# Patient Record
Sex: FEMALE | Race: BLACK OR AFRICAN AMERICAN | NOT HISPANIC OR LATINO | Employment: OTHER | URBAN - METROPOLITAN AREA
[De-identification: names, ages, dates, MRNs, and addresses within clinical notes are randomized per-mention and may not be internally consistent; named-entity substitution may affect disease eponyms.]

---

## 2023-04-18 ENCOUNTER — LAB (OUTPATIENT)
Dept: LAB | Facility: LAB | Age: 73
End: 2023-04-18
Payer: MEDICARE

## 2023-04-18 ENCOUNTER — OFFICE VISIT (OUTPATIENT)
Dept: PRIMARY CARE | Facility: CLINIC | Age: 73
End: 2023-04-18
Payer: MEDICARE

## 2023-04-18 VITALS
BODY MASS INDEX: 26.63 KG/M2 | TEMPERATURE: 97 F | HEART RATE: 96 BPM | SYSTOLIC BLOOD PRESSURE: 126 MMHG | WEIGHT: 156 LBS | HEIGHT: 64 IN | DIASTOLIC BLOOD PRESSURE: 81 MMHG | OXYGEN SATURATION: 99 %

## 2023-04-18 DIAGNOSIS — Z85.41 HISTORY OF CERVICAL CANCER: ICD-10-CM

## 2023-04-18 DIAGNOSIS — R26.81 GAIT INSTABILITY: ICD-10-CM

## 2023-04-18 DIAGNOSIS — R26.81 GAIT INSTABILITY: Primary | ICD-10-CM

## 2023-04-18 DIAGNOSIS — Z98.1 HISTORY OF LUMBAR FUSION: ICD-10-CM

## 2023-04-18 LAB
ALANINE AMINOTRANSFERASE (SGPT) (U/L) IN SER/PLAS: 18 U/L (ref 7–45)
ALBUMIN (G/DL) IN SER/PLAS: 4.1 G/DL (ref 3.4–5)
ALKALINE PHOSPHATASE (U/L) IN SER/PLAS: 109 U/L (ref 33–136)
ANION GAP IN SER/PLAS: 13 MMOL/L (ref 10–20)
ASPARTATE AMINOTRANSFERASE (SGOT) (U/L) IN SER/PLAS: 18 U/L (ref 9–39)
BASOPHILS (10*3/UL) IN BLOOD BY AUTOMATED COUNT: 0.02 X10E9/L (ref 0–0.1)
BASOPHILS/100 LEUKOCYTES IN BLOOD BY AUTOMATED COUNT: 0.3 % (ref 0–2)
BILIRUBIN TOTAL (MG/DL) IN SER/PLAS: 0.4 MG/DL (ref 0–1.2)
CALCIUM (MG/DL) IN SER/PLAS: 9.7 MG/DL (ref 8.6–10.6)
CARBON DIOXIDE, TOTAL (MMOL/L) IN SER/PLAS: 26 MMOL/L (ref 21–32)
CHLORIDE (MMOL/L) IN SER/PLAS: 103 MMOL/L (ref 98–107)
CREATININE (MG/DL) IN SER/PLAS: 0.6 MG/DL (ref 0.5–1.05)
EOSINOPHILS (10*3/UL) IN BLOOD BY AUTOMATED COUNT: 0.11 X10E9/L (ref 0–0.4)
EOSINOPHILS/100 LEUKOCYTES IN BLOOD BY AUTOMATED COUNT: 1.9 % (ref 0–6)
ERYTHROCYTE DISTRIBUTION WIDTH (RATIO) BY AUTOMATED COUNT: 13.1 % (ref 11.5–14.5)
ERYTHROCYTE MEAN CORPUSCULAR HEMOGLOBIN CONCENTRATION (G/DL) BY AUTOMATED: 31.9 G/DL (ref 32–36)
ERYTHROCYTE MEAN CORPUSCULAR VOLUME (FL) BY AUTOMATED COUNT: 94 FL (ref 80–100)
ERYTHROCYTES (10*6/UL) IN BLOOD BY AUTOMATED COUNT: 4.2 X10E12/L (ref 4–5.2)
GFR FEMALE: >90 ML/MIN/1.73M2
GLUCOSE (MG/DL) IN SER/PLAS: 82 MG/DL (ref 74–99)
HEMATOCRIT (%) IN BLOOD BY AUTOMATED COUNT: 39.5 % (ref 36–46)
HEMOGLOBIN (G/DL) IN BLOOD: 12.6 G/DL (ref 12–16)
IMMATURE GRANULOCYTES/100 LEUKOCYTES IN BLOOD BY AUTOMATED COUNT: 0.2 % (ref 0–0.9)
LEUKOCYTES (10*3/UL) IN BLOOD BY AUTOMATED COUNT: 5.8 X10E9/L (ref 4.4–11.3)
LYMPHOCYTES (10*3/UL) IN BLOOD BY AUTOMATED COUNT: 1.63 X10E9/L (ref 0.8–3)
LYMPHOCYTES/100 LEUKOCYTES IN BLOOD BY AUTOMATED COUNT: 28 % (ref 13–44)
MONOCYTES (10*3/UL) IN BLOOD BY AUTOMATED COUNT: 0.53 X10E9/L (ref 0.05–0.8)
MONOCYTES/100 LEUKOCYTES IN BLOOD BY AUTOMATED COUNT: 9.1 % (ref 2–10)
NEUTROPHILS (10*3/UL) IN BLOOD BY AUTOMATED COUNT: 3.52 X10E9/L (ref 1.6–5.5)
NEUTROPHILS/100 LEUKOCYTES IN BLOOD BY AUTOMATED COUNT: 60.5 % (ref 40–80)
NRBC (PER 100 WBCS) BY AUTOMATED COUNT: 0 /100 WBC (ref 0–0)
PLATELETS (10*3/UL) IN BLOOD AUTOMATED COUNT: 256 X10E9/L (ref 150–450)
POTASSIUM (MMOL/L) IN SER/PLAS: 4.6 MMOL/L (ref 3.5–5.3)
PROTEIN TOTAL: 7.3 G/DL (ref 6.4–8.2)
SODIUM (MMOL/L) IN SER/PLAS: 137 MMOL/L (ref 136–145)
UREA NITROGEN (MG/DL) IN SER/PLAS: 12 MG/DL (ref 6–23)

## 2023-04-18 PROCEDURE — 85025 COMPLETE CBC W/AUTO DIFF WBC: CPT

## 2023-04-18 PROCEDURE — 99204 OFFICE O/P NEW MOD 45 MIN: CPT | Performed by: STUDENT IN AN ORGANIZED HEALTH CARE EDUCATION/TRAINING PROGRAM

## 2023-04-18 PROCEDURE — 36415 COLL VENOUS BLD VENIPUNCTURE: CPT

## 2023-04-18 PROCEDURE — 80053 COMPREHEN METABOLIC PANEL: CPT

## 2023-04-18 PROCEDURE — 1036F TOBACCO NON-USER: CPT | Performed by: STUDENT IN AN ORGANIZED HEALTH CARE EDUCATION/TRAINING PROGRAM

## 2023-04-18 PROCEDURE — 1159F MED LIST DOCD IN RCRD: CPT | Performed by: STUDENT IN AN ORGANIZED HEALTH CARE EDUCATION/TRAINING PROGRAM

## 2023-04-18 ASSESSMENT — PAIN SCALES - GENERAL: PAINLEVEL: 8

## 2023-04-18 NOTE — PROGRESS NOTES
"  Medical record number: [id number]    Subjective   Patient ID: Vanesa Zelaya is a 72 y.o. female who presents for Follow-up.    1. Health maintenance  Taking turmeric, morenga, [place]'s wort,   Melatonin self DC, switched to chamomile tea      2. Hip pain  H/o lumbar spinal surgery - lumbar fusion  Hip slipping out of joint  Left side in particular  H/o cervical cancer, s/p partial hysterectomy, retained ovaries  Went skating a few weeks ago  Rearrenged furniture a few days ago  Sugar tends to make inflammation worse    Children   55  48  46             Social History:  - Lives on her own;  recently  - Feels safe at ome  - Tobacco or vaping: none  - Alcohol use: none  - Marijuana use: none  - Illicit drug use: none  - adult children live in different cities  - takes lots of supplements    Past Medical History:  BPD/psychosis/depression    Past Surgical History:  See above    Review of Systems   All other systems reviewed and are negative.      Objective   /81 (BP Location: Left arm, Patient Position: Sitting)   Pulse 96   Temp 36.1 °C (97 °F)   Ht 1.626 m (5' 4\")   Wt 70.8 kg (156 lb)   SpO2 99%   BMI 26.78 kg/m²     Physical Exam  Constitutional:       Appearance: Normal appearance.   HENT:      Head: Normocephalic and atraumatic.      Nose: Nose normal.   Eyes:      Extraocular Movements: Extraocular movements intact.      Pupils: Pupils are equal, round, and reactive to light.   Cardiovascular:      Rate and Rhythm: Normal rate and regular rhythm.      Pulses: Normal pulses.      Heart sounds: Normal heart sounds.   Pulmonary:      Effort: Pulmonary effort is normal.      Breath sounds: Normal breath sounds.   Abdominal:      General: Abdomen is flat.      Palpations: Abdomen is soft.   Musculoskeletal:      Cervical back: Normal range of motion and neck supple.      Lumbar back: No swelling, edema, deformity, tenderness or bony tenderness. Normal range of motion. Negative right straight leg " raise test and negative left straight leg raise test.      Right hip: No deformity, tenderness or bony tenderness. Decreased range of motion. Normal strength.      Left hip: No deformity, tenderness or bony tenderness. Decreased range of motion. Normal strength.      Comments: Midline scar from approx L2 to sacrum.  Get up and go test 3 seconds, requiring use of upper extremities.  Active/passive ROM of the bilat hips limited by pain in the hip joints, but the joints themselves are not TTP.   Skin:     General: Skin is warm and dry.   Neurological:      General: No focal deficit present.      Mental Status: She is alert and oriented to person, place, and time.   Psychiatric:         Mood and Affect: Mood normal.         Behavior: Behavior normal.         Thought Content: Thought content normal.         Judgment: Judgment normal.         PHYSICAL EXAM:    Assessment/Plan   Problem List Items Addressed This Visit    None  Visit Diagnoses       Gait instability    -  Primary    Relevant Orders    CBC and Auto Differential    Comprehensive metabolic panel    XR hip left 2 or 3 views    XR hip right 2 or 3 views    History of lumbar fusion        Relevant Orders    XR lumbar spine complete 4+ views    History of cervical cancer        Relevant Orders    CBC and Auto Differential    Comprehensive metabolic panel                   -------------------------------------------------------------------------------    **This note was entered into the electronic medical record using Dragon medical dictation software, and was not edited thereafter. Please excuse any errors of spelling or grammar.**    -------------------------------------------------------------------------------    OVERALL PLAN:  [ ] Cmp, CBC for screening for signs of recrurrence of CA  [ ] XR lumbar spine, bilateral hips    TOTAL time 50 minutes.

## 2023-05-19 ENCOUNTER — APPOINTMENT (OUTPATIENT)
Dept: PRIMARY CARE | Facility: CLINIC | Age: 73
End: 2023-05-19
Payer: MEDICARE

## 2023-06-20 ENCOUNTER — OFFICE VISIT (OUTPATIENT)
Dept: PRIMARY CARE | Facility: CLINIC | Age: 73
End: 2023-06-20
Payer: MEDICARE

## 2023-06-20 VITALS
OXYGEN SATURATION: 97 % | TEMPERATURE: 97.3 F | DIASTOLIC BLOOD PRESSURE: 88 MMHG | HEART RATE: 91 BPM | BODY MASS INDEX: 26.5 KG/M2 | HEIGHT: 64 IN | WEIGHT: 155.2 LBS | SYSTOLIC BLOOD PRESSURE: 137 MMHG

## 2023-06-20 DIAGNOSIS — H69.91 DYSFUNCTION OF RIGHT EUSTACHIAN TUBE: Primary | ICD-10-CM

## 2023-06-20 DIAGNOSIS — G89.29 CHRONIC PAIN OF BOTH HIPS: ICD-10-CM

## 2023-06-20 DIAGNOSIS — M25.551 CHRONIC PAIN OF BOTH HIPS: ICD-10-CM

## 2023-06-20 DIAGNOSIS — M25.552 CHRONIC PAIN OF BOTH HIPS: ICD-10-CM

## 2023-06-20 DIAGNOSIS — Z98.1 HISTORY OF LUMBAR FUSION: ICD-10-CM

## 2023-06-20 PROCEDURE — 1160F RVW MEDS BY RX/DR IN RCRD: CPT | Performed by: STUDENT IN AN ORGANIZED HEALTH CARE EDUCATION/TRAINING PROGRAM

## 2023-06-20 PROCEDURE — 1159F MED LIST DOCD IN RCRD: CPT | Performed by: STUDENT IN AN ORGANIZED HEALTH CARE EDUCATION/TRAINING PROGRAM

## 2023-06-20 PROCEDURE — 99214 OFFICE O/P EST MOD 30 MIN: CPT | Performed by: STUDENT IN AN ORGANIZED HEALTH CARE EDUCATION/TRAINING PROGRAM

## 2023-06-20 PROCEDURE — 1036F TOBACCO NON-USER: CPT | Performed by: STUDENT IN AN ORGANIZED HEALTH CARE EDUCATION/TRAINING PROGRAM

## 2023-06-20 RX ORDER — FLUTICASONE PROPIONATE 50 MCG
1 SPRAY, SUSPENSION (ML) NASAL DAILY
Qty: 16 G | Refills: 2 | Status: SHIPPED | OUTPATIENT
Start: 2023-06-20 | End: 2023-07-19

## 2023-06-20 ASSESSMENT — PAIN SCALES - GENERAL: PAINLEVEL: 9

## 2023-06-20 NOTE — PROGRESS NOTES
"06/20/23    Subjective   Patient ID: Vanesa Zelaya is a 73 y.o. female who presents for Follow-up.    HPI    Hip pain  - seen last visit  - had XR done at [facility], unable to see them  - continues to have pain, worse when she is up and moving around  - no radiation of pain  - feels like bone on bone    Ear Concerns  - hearing changes in right ear  - cleans ears regularly, using Q-tips  - have had hearing problems off and on, but worsened in the last week  - feels like water int he ear, doesn't consistently have pain      Objective   /88 (BP Location: Left arm, Patient Position: Sitting)   Pulse 91   Temp 36.3 °C (97.3 °F) (Temporal)   Ht 1.626 m (5' 4\")   Wt 70.4 kg (155 lb 3.2 oz)   SpO2 97%   BMI 26.64 kg/m²     Physical Exam    General: well-appearing, NAD  HEENT: NC/AT; ears normal appearing, TM visible bilaterally without cerumen; left TM with serous middle ear effusion present  Lungs: normal work of breathing  Neuro: grossly intact  Psych: no depression, anxiety      Assessment/Plan   Problem List Items Addressed This Visit    None  Visit Diagnoses       Dysfunction of right eustachian tube    -  Primary    Relevant Medications    fluticasone (Flonase) 50 mcg/actuation nasal spray    History of lumbar fusion        Chronic pain of both hips              Ear discomfort  - acute, uncomplicated  - likely eustachion dysfunction given middle ear effusion  - start flonase, use daily for at least 5-7 days then prn pending symptoms    Hip and low back pain  - chronic, not at goal  - unable to see prior xrays, will obtain new xrays today  - plan likely for PT (patient prefers aquatic)    RTC in about 6 weeks for follow up of hip pain    Geovany Aquino MD  "

## 2023-06-23 ENCOUNTER — TELEPHONE (OUTPATIENT)
Dept: PRIMARY CARE | Facility: CLINIC | Age: 73
End: 2023-06-23
Payer: MEDICARE

## 2023-06-23 NOTE — TELEPHONE ENCOUNTER
Called and spoke with the patient regarding imagine results.  Told her that it shows evidence of moderate arthritis in her hips, and her lower back has degenerative changes as well, but seems to be stable from prior imaging about 1.5 years ago.  Still recommend PT as the first option, and patient states she will call to get water therapy scheduled.  No further questions at this time.    Geovany Aquino MD

## 2023-08-08 ENCOUNTER — OFFICE VISIT (OUTPATIENT)
Dept: PRIMARY CARE | Facility: CLINIC | Age: 73
End: 2023-08-08
Payer: MEDICARE

## 2023-08-08 VITALS
HEART RATE: 94 BPM | OXYGEN SATURATION: 99 % | WEIGHT: 153.8 LBS | SYSTOLIC BLOOD PRESSURE: 162 MMHG | HEIGHT: 63 IN | TEMPERATURE: 97.6 F | BODY MASS INDEX: 27.25 KG/M2 | DIASTOLIC BLOOD PRESSURE: 99 MMHG

## 2023-08-08 DIAGNOSIS — R03.0 ELEVATED BLOOD PRESSURE READING: Primary | ICD-10-CM

## 2023-08-08 DIAGNOSIS — G89.29 CHRONIC LOW BACK PAIN WITHOUT SCIATICA, UNSPECIFIED BACK PAIN LATERALITY: ICD-10-CM

## 2023-08-08 DIAGNOSIS — Z71.1 CONCERN ABOUT EYE DISEASE WITHOUT DIAGNOSIS: ICD-10-CM

## 2023-08-08 DIAGNOSIS — F43.21 GRIEF REACTION: ICD-10-CM

## 2023-08-08 DIAGNOSIS — M54.50 CHRONIC LOW BACK PAIN WITHOUT SCIATICA, UNSPECIFIED BACK PAIN LATERALITY: ICD-10-CM

## 2023-08-08 PROCEDURE — 99213 OFFICE O/P EST LOW 20 MIN: CPT | Performed by: STUDENT IN AN ORGANIZED HEALTH CARE EDUCATION/TRAINING PROGRAM

## 2023-08-08 PROCEDURE — 1160F RVW MEDS BY RX/DR IN RCRD: CPT | Performed by: STUDENT IN AN ORGANIZED HEALTH CARE EDUCATION/TRAINING PROGRAM

## 2023-08-08 PROCEDURE — 1036F TOBACCO NON-USER: CPT | Performed by: STUDENT IN AN ORGANIZED HEALTH CARE EDUCATION/TRAINING PROGRAM

## 2023-08-08 PROCEDURE — 1126F AMNT PAIN NOTED NONE PRSNT: CPT | Performed by: STUDENT IN AN ORGANIZED HEALTH CARE EDUCATION/TRAINING PROGRAM

## 2023-08-08 PROCEDURE — 1159F MED LIST DOCD IN RCRD: CPT | Performed by: STUDENT IN AN ORGANIZED HEALTH CARE EDUCATION/TRAINING PROGRAM

## 2023-08-08 ASSESSMENT — PAIN SCALES - GENERAL: PAINLEVEL: 0-NO PAIN

## 2023-08-08 NOTE — PROGRESS NOTES
"08/08/23    Subjective   Patient ID: Vanesa Zelaya is a 73 y.o. female who presents for Follow-up.    HPI    Back pain  - hasn't started PT yet  - has been doing PT on youtube, helping significantly  - interested in water therapy    HTN  - history of HTN  - not on medications  - checks at home, never above 130 systolic    Eye concerns  - told she had cataracts and/or glaucoma  - wants second opinion    Grief  - surrounding death of her   - doesn't want any meds  - interested in a counselor      Objective   BP (!) 162/99 (BP Location: Left arm, Patient Position: Sitting, BP Cuff Size: Adult)   Pulse 94   Temp 36.4 °C (97.6 °F) (Temporal)   Ht 1.6 m (5' 3\")   Wt 69.8 kg (153 lb 12.8 oz)   SpO2 99%   BMI 27.24 kg/m²     Physical Exam    General: well-appearing, NAD  HEENT: NC/AT  Lungs: normal work of breathing  Neuro: grossly intact  Psych: no depression, anxiety      Assessment/Plan   Problem List Items Addressed This Visit    None  Visit Diagnoses       Elevated blood pressure reading    -  Primary    Chronic low back pain without sciatica, unspecified back pain laterality        Relevant Orders    Referral to Physical Therapy    Concern about eye disease without diagnosis        Relevant Orders    Referral to Ophthalmology    Grief reaction              Elevated Bp  - normal at home  - nothing to do at this time, patient will continue to monitor    LBP  - chronic, stable  - continue current exercises  - patient intersted in aquatic therapy, referral placed    Eye Concerns  - patient told she had glaucoma and cataracts, would like to see  eye specialists  - ophtho referral placed    Grief  - chronic, stable  - given community resources to call to scheduled with therapist    RTC as needed for follow up of LBP    Geovany Aquino MD  "

## 2023-09-27 PROBLEM — H90.3 SENSORINEURAL HEARING LOSS (SNHL) OF BOTH EARS: Status: ACTIVE | Noted: 2023-09-27

## 2023-09-27 PROBLEM — H93.19 TINNITUS: Status: ACTIVE | Noted: 2023-09-27

## 2023-09-27 PROBLEM — M99.09 SEGMENTAL AND SOMATIC DYSFUNCTION: Status: ACTIVE | Noted: 2023-09-27

## 2023-09-27 PROBLEM — M54.40 CHRONIC RIGHT-SIDED LOW BACK PAIN WITH SCIATICA: Status: ACTIVE | Noted: 2021-12-20

## 2023-09-27 PROBLEM — M54.12 RADICULITIS, CERVICAL: Status: ACTIVE | Noted: 2023-09-27

## 2023-09-27 PROBLEM — K45.8 HERNIA, INTERNAL: Status: ACTIVE | Noted: 2023-09-27

## 2023-09-27 PROBLEM — R03.0 ELEVATED BLOOD PRESSURE READING: Status: ACTIVE | Noted: 2023-09-27

## 2023-09-27 PROBLEM — H93.13 SUBJECTIVE TINNITUS OF BOTH EARS: Status: ACTIVE | Noted: 2023-09-27

## 2023-09-27 PROBLEM — G89.29 CHRONIC RIGHT-SIDED LOW BACK PAIN WITH SCIATICA: Status: ACTIVE | Noted: 2021-12-20

## 2023-09-27 PROBLEM — M47.812 CERVICAL SPONDYLOSIS WITHOUT MYELOPATHY: Status: ACTIVE | Noted: 2023-09-27

## 2023-09-27 PROBLEM — M43.10 SPONDYLOLISTHESIS, GRADE 1: Status: ACTIVE | Noted: 2023-09-27

## 2023-09-27 PROBLEM — F43.10 POST TRAUMATIC STRESS DISORDER (PTSD): Status: ACTIVE | Noted: 2023-09-27

## 2023-09-27 PROBLEM — R31.9 HEMATURIA: Status: ACTIVE | Noted: 2023-09-27

## 2023-09-27 PROBLEM — G51.0 BELL'S PALSY: Status: ACTIVE | Noted: 2023-09-27

## 2023-09-27 PROBLEM — I10 HYPERTENSION: Status: ACTIVE | Noted: 2023-09-27

## 2023-09-27 PROBLEM — G47.00 INSOMNIA: Status: ACTIVE | Noted: 2023-09-27

## 2023-09-27 RX ORDER — CHOLECALCIFEROL (VITAMIN D3) 125 MCG
CAPSULE ORAL DAILY
COMMUNITY
End: 2024-03-19 | Stop reason: WASHOUT

## 2023-10-06 ENCOUNTER — EVALUATION (OUTPATIENT)
Dept: PHYSICAL THERAPY | Facility: CLINIC | Age: 73
End: 2023-10-06
Payer: MEDICARE

## 2023-10-06 DIAGNOSIS — G89.29 CHRONIC RIGHT-SIDED LOW BACK PAIN WITH SCIATICA, SCIATICA LATERALITY UNSPECIFIED: Primary | ICD-10-CM

## 2023-10-06 DIAGNOSIS — G89.29 CHRONIC LOW BACK PAIN WITHOUT SCIATICA, UNSPECIFIED BACK PAIN LATERALITY: ICD-10-CM

## 2023-10-06 DIAGNOSIS — R29.3 ABNORMAL POSTURE: ICD-10-CM

## 2023-10-06 DIAGNOSIS — M54.50 CHRONIC LOW BACK PAIN WITHOUT SCIATICA, UNSPECIFIED BACK PAIN LATERALITY: ICD-10-CM

## 2023-10-06 DIAGNOSIS — M54.40 CHRONIC RIGHT-SIDED LOW BACK PAIN WITH SCIATICA, SCIATICA LATERALITY UNSPECIFIED: Primary | ICD-10-CM

## 2023-10-06 PROBLEM — M62.81 MUSCLE WEAKNESS: Status: ACTIVE | Noted: 2023-10-06

## 2023-10-06 PROCEDURE — 87086 URINE CULTURE/COLONY COUNT: CPT

## 2023-10-06 PROCEDURE — 97535 SELF CARE MNGMENT TRAINING: CPT | Mod: GP | Performed by: PHYSICAL THERAPIST

## 2023-10-06 PROCEDURE — 87186 SC STD MICRODIL/AGAR DIL: CPT

## 2023-10-06 PROCEDURE — 97163 PT EVAL HIGH COMPLEX 45 MIN: CPT | Mod: GP | Performed by: PHYSICAL THERAPIST

## 2023-10-06 ASSESSMENT — ENCOUNTER SYMPTOMS
OCCASIONAL FEELINGS OF UNSTEADINESS: 1
DEPRESSION: 0
LOSS OF SENSATION IN FEET: 0

## 2023-10-06 NOTE — LETTER
October 6, 2023     Patient: Vanesa Zelaya   YOB: 1950   Date of Visit: 10/6/2023       To Whom it May Concern:    Vanesa Zelaya was seen in my clinic on 10/6/2023. She {Return to school/sport:61745}.    If you have any questions or concerns, please don't hesitate to call.         Sincerely,          Daysi Patel, PT        CC: No Recipients
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
## 2023-10-06 NOTE — LETTER
October 6, 2023     Patient: Vanesa Zelaya   YOB: 1950   Date of Visit: 10/6/2023       To Whom It May Concern:    It is my medical opinion that Vanesa Zelaya {Work release (duty restriction):50880}.    If you have any questions or concerns, please don't hesitate to call.         Sincerely,        Daysi Patel, PT    CC: No Recipients

## 2023-10-07 ENCOUNTER — LAB REQUISITION (OUTPATIENT)
Dept: LAB | Facility: HOSPITAL | Age: 73
End: 2023-10-07
Payer: MEDICARE

## 2023-10-07 DIAGNOSIS — N39.0 URINARY TRACT INFECTION, SITE NOT SPECIFIED: ICD-10-CM

## 2023-10-09 ENCOUNTER — TREATMENT (OUTPATIENT)
Dept: PHYSICAL THERAPY | Facility: CLINIC | Age: 73
End: 2023-10-09
Payer: MEDICARE

## 2023-10-09 DIAGNOSIS — G89.29 CHRONIC RIGHT-SIDED LOW BACK PAIN WITH SCIATICA, SCIATICA LATERALITY UNSPECIFIED: ICD-10-CM

## 2023-10-09 DIAGNOSIS — R29.3 ABNORMAL POSTURE: ICD-10-CM

## 2023-10-09 DIAGNOSIS — G89.29 CHRONIC LOW BACK PAIN WITHOUT SCIATICA, UNSPECIFIED BACK PAIN LATERALITY: Primary | ICD-10-CM

## 2023-10-09 DIAGNOSIS — M54.40 CHRONIC RIGHT-SIDED LOW BACK PAIN WITH SCIATICA, SCIATICA LATERALITY UNSPECIFIED: ICD-10-CM

## 2023-10-09 DIAGNOSIS — M54.50 CHRONIC LOW BACK PAIN WITHOUT SCIATICA, UNSPECIFIED BACK PAIN LATERALITY: Primary | ICD-10-CM

## 2023-10-09 PROCEDURE — 97110 THERAPEUTIC EXERCISES: CPT | Mod: GP | Performed by: PHYSICAL THERAPIST

## 2023-10-09 PROCEDURE — 97535 SELF CARE MNGMENT TRAINING: CPT | Mod: GP | Performed by: PHYSICAL THERAPIST

## 2023-10-09 NOTE — PROGRESS NOTES
Physical Therapy  Physical Therapy Treatment    Patient Name: Vanesa Zelaya  MRN: [id number]  Today's Date: 10/9/2023       Current Problem  1. Chronic low back pain without sciatica, unspecified back pain laterality        2. Chronic right-sided low back pain with sciatica, sciatica laterality unspecified        3. Abnormal posture              Insurance:   Auth for 14 visits through 1/7/24, not including evaluation    General     Precautions  Precautions  STEADI Fall Risk Score (The score of 4 or more indicates an increased risk of falling): 8  Vital Signs     Pain       Subjective:   Patient reports stiffness and aching in the hips and back okay this morning. L hip is worse.   - went to urgent care and was put on antibiotics for UTI  - no hip surgeries, only back surgeries    Objective:   Posture: forward head, rounded shoulders, increased thoracic kyphosis, decreased lumbar lordosis  SL balance: 10 sec on each, increased hip/torso sway  DL Squat: depth wnl  Hypermobility of hip joints  Hip ROM: mild end range joint limitations  Hamstrings: able to get foot to head or hands to floor bilaterally  Lumbar rotation: mid-TL junction mobility and hip compensation  Hip MMT: 4-/5 bilaterally  Core: moderate    Treatments:   Exercise Sets/Reps Comments   Education: emphasis on strength, stability and joint blood flow/circulation, discussed slow progression of exercises, stretching and weights/resistance, discussed pool/aquatic therapy and land based expectations   DL squat     Tandem balance     Hip stretching: piriformis, hamstring     Lumbar rotation stretching                   Assessment: Patient presents with signs and symptoms consistent with OA of lumbar spine and hips, muscle weakness, abnormal posture, resulting in limited participation in pain-free ADLs and inability to perform at their prior level of function. Pt would benefit from physical therapy to address the impairments found & listed previously in the  objective section in order to return to safe and pain-free ADLs and prior level of function.       Plan: aquatic therapy for 4 visits, then reassess for land or DC       Daysi Patel, PT

## 2023-10-10 LAB — BACTERIA UR CULT: ABNORMAL

## 2023-10-13 ENCOUNTER — APPOINTMENT (OUTPATIENT)
Dept: PHYSICAL THERAPY | Facility: CLINIC | Age: 73
End: 2023-10-13
Payer: MEDICARE

## 2023-10-20 ENCOUNTER — TREATMENT (OUTPATIENT)
Dept: PHYSICAL THERAPY | Facility: CLINIC | Age: 73
End: 2023-10-20
Payer: MEDICARE

## 2023-10-20 ENCOUNTER — APPOINTMENT (OUTPATIENT)
Dept: PHYSICAL THERAPY | Facility: CLINIC | Age: 73
End: 2023-10-20
Payer: MEDICARE

## 2023-10-20 DIAGNOSIS — G89.29 CHRONIC RIGHT-SIDED LOW BACK PAIN WITH SCIATICA, SCIATICA LATERALITY UNSPECIFIED: ICD-10-CM

## 2023-10-20 DIAGNOSIS — R29.3 ABNORMAL POSTURE: ICD-10-CM

## 2023-10-20 DIAGNOSIS — M54.40 CHRONIC RIGHT-SIDED LOW BACK PAIN WITH SCIATICA, SCIATICA LATERALITY UNSPECIFIED: ICD-10-CM

## 2023-10-20 DIAGNOSIS — M54.50 CHRONIC LOW BACK PAIN WITHOUT SCIATICA, UNSPECIFIED BACK PAIN LATERALITY: ICD-10-CM

## 2023-10-20 DIAGNOSIS — G89.29 CHRONIC LOW BACK PAIN WITHOUT SCIATICA, UNSPECIFIED BACK PAIN LATERALITY: ICD-10-CM

## 2023-10-20 PROCEDURE — 97113 AQUATIC THERAPY/EXERCISES: CPT | Mod: GP,CQ

## 2023-10-20 ASSESSMENT — PAIN SCALES - GENERAL: PAINLEVEL_OUTOF10: 6

## 2023-10-20 ASSESSMENT — PAIN - FUNCTIONAL ASSESSMENT: PAIN_FUNCTIONAL_ASSESSMENT: 0-10

## 2023-10-20 NOTE — PROGRESS NOTES
Physical Therapy  Physical Therapy Treatment    Patient Name: Vanesa Zelaya  MRN: [id number]  Today's Date: 10/20/2023  Time Calculation  Start Time: 1345  Stop Time: 1430  Time Calculation (min): 45 min    Current Problem  1. Chronic low back pain without sciatica, unspecified back pain laterality  Follow Up In Physical Therapy      2. Chronic right-sided low back pain with sciatica, sciatica laterality unspecified  Follow Up In Physical Therapy      3. Abnormal posture  Follow Up In Physical Therapy              Insurance:  Visit 3 of 14  Auth for 14 visits through 1/7/24, not including evaluation  [employer] medicare        Precautions  Precautions Comment: None       Pain  Pain Assessment: 0-10  Pain Score: 6    Subjective:   Patient reports stiffness and aching in the hips and back with LBP 6/10 and left hip 8/10.  15 min late today.        Objective:   Posture: forward head, rounded shoulders, increased thoracic kyphosis, decreased lumbar lordosis    TREATMENT:  Water level 4 FT   Water temp 92   MIP 5 '  Alt Abd/add 5 '  A/P LE swings 3 ' each  Back at wall:  KB pushes and rotation 2 min each   H/T raises 3 min  Mini squats 3'  LE circles  1 ' each  HS st. Step 1' each                   Assessment:   Aquatic environment allowed for greater ease of motion and better tolerance of prolonged exercising without increased pain or stiffness.  Cues to stay in comfortable range.  Discussed using her Silver Sneakers benefit at [facility]           Plan: aquatic therapy for 3 more visits then reassess for land or DC per plan  Last 2 visits are with therapist.  Issue aquatic ex next visit.         Yesy Nicole, PTA

## 2023-10-26 NOTE — PROGRESS NOTES
Physical Therapy  Physical Therapy Treatment    Patient Name: Vanesa Zelaya  MRN: [id number]  Today's Date: 10/27/2023  Time Calculation  Start Time: 1150  Stop Time: 1230  Time Calculation (min): 40 min    Current Problem  1. Chronic low back pain without sciatica, unspecified back pain laterality  Follow Up In Physical Therapy      2. Chronic right-sided low back pain with sciatica, sciatica laterality unspecified  Follow Up In Physical Therapy      3. Abnormal posture  Follow Up In Physical Therapy                Insurance:  Visit 4of 14  Auth for 14 visits through 1/7/24, not including evaluation  [employer] medicare        Precautions  Precautions Comment: None       Pain  Pain Assessment: 0-10  Pain Score: 8    Subjective:   Patient reports stiffness and aching in the hips and back with B hip pain 8/10 today.  Felt great after her last pool visit and then did a lot of yard work because she was feeling good but suffered with increased pain for a few days after yard work.       Objective:   Posture: forward head, rounded shoulders, increased thoracic kyphosis, decreased lumbar lordosis    TREATMENT:  Water level 4 FT   Water temp 92   Walk F/B 5 min.  Lateral 5 min   MIP 5 '  Alt Abd/add 5 '  TT and small lifts   A/P LE swings 3 ' each  Back at wall:  KB pushes and rotation 2 min each   H/T raises 3 min  Mini squats 3'  LE circles  2 ' each  HS st. Ledge  1' each       Assessment:   Aquatic environment allowed for greater ease of motion and better tolerance of prolonged exercising without increased pain or stiffness.  Cues to stay in comfortable range.           Plan: aquatic therapy for 2 more visits  with therapist then reassess for land or DC per plan         Yesy Nicole, PTA

## 2023-10-27 ENCOUNTER — TREATMENT (OUTPATIENT)
Dept: PHYSICAL THERAPY | Facility: CLINIC | Age: 73
End: 2023-10-27
Payer: MEDICARE

## 2023-10-27 DIAGNOSIS — G89.29 CHRONIC LOW BACK PAIN WITHOUT SCIATICA, UNSPECIFIED BACK PAIN LATERALITY: ICD-10-CM

## 2023-10-27 DIAGNOSIS — R29.3 ABNORMAL POSTURE: ICD-10-CM

## 2023-10-27 DIAGNOSIS — G89.29 CHRONIC RIGHT-SIDED LOW BACK PAIN WITH SCIATICA, SCIATICA LATERALITY UNSPECIFIED: ICD-10-CM

## 2023-10-27 DIAGNOSIS — M54.40 CHRONIC RIGHT-SIDED LOW BACK PAIN WITH SCIATICA, SCIATICA LATERALITY UNSPECIFIED: ICD-10-CM

## 2023-10-27 DIAGNOSIS — M54.50 CHRONIC LOW BACK PAIN WITHOUT SCIATICA, UNSPECIFIED BACK PAIN LATERALITY: ICD-10-CM

## 2023-10-27 PROCEDURE — 97113 AQUATIC THERAPY/EXERCISES: CPT | Mod: GP,CQ

## 2023-10-27 ASSESSMENT — PAIN SCALES - GENERAL: PAINLEVEL_OUTOF10: 8

## 2023-10-27 ASSESSMENT — PAIN - FUNCTIONAL ASSESSMENT: PAIN_FUNCTIONAL_ASSESSMENT: 0-10

## 2023-11-03 ENCOUNTER — TREATMENT (OUTPATIENT)
Dept: PHYSICAL THERAPY | Facility: CLINIC | Age: 73
End: 2023-11-03
Payer: MEDICARE

## 2023-11-03 DIAGNOSIS — M54.40 CHRONIC RIGHT-SIDED LOW BACK PAIN WITH SCIATICA, SCIATICA LATERALITY UNSPECIFIED: ICD-10-CM

## 2023-11-03 DIAGNOSIS — M54.50 CHRONIC LOW BACK PAIN WITHOUT SCIATICA, UNSPECIFIED BACK PAIN LATERALITY: ICD-10-CM

## 2023-11-03 DIAGNOSIS — R29.3 ABNORMAL POSTURE: ICD-10-CM

## 2023-11-03 DIAGNOSIS — G89.29 CHRONIC LOW BACK PAIN WITHOUT SCIATICA, UNSPECIFIED BACK PAIN LATERALITY: ICD-10-CM

## 2023-11-03 DIAGNOSIS — G89.29 CHRONIC RIGHT-SIDED LOW BACK PAIN WITH SCIATICA, SCIATICA LATERALITY UNSPECIFIED: ICD-10-CM

## 2023-11-03 PROCEDURE — 97113 AQUATIC THERAPY/EXERCISES: CPT | Mod: GP | Performed by: PHYSICAL THERAPIST

## 2023-11-03 ASSESSMENT — PAIN SCALES - GENERAL: PAINLEVEL_OUTOF10: 8

## 2023-11-03 NOTE — PROGRESS NOTES
Physical Therapy  Physical Therapy Treatment    Patient Name: Vanesa Zelaya  MRN: [id number]  Today's Date: 11/3/2023       Current Problem  1. Chronic low back pain without sciatica, unspecified back pain laterality  Follow Up In Physical Therapy      2. Chronic right-sided low back pain with sciatica, sciatica laterality unspecified  Follow Up In Physical Therapy      3. Abnormal posture  Follow Up In Physical Therapy          Insurance:  Visit 5 of 14  Auth for 14 visits through 1/7/24, not including evaluation  [employer] medicare            Pain  Pain Score: 8    Subjective:   Patient reports stiffness and aching in the hips and back with B hip pain 8/10 today.  Felt great after her last pool visit  - discussed with Yesy wilson [facility] location and location for senior center fitness     LBP: 8/10 - back and hips    Objective:   Posture: forward head, rounded shoulders, increased thoracic kyphosis, decreased lumbar lordosis  - hip flexion in sitting and standing    TREATMENT:  Water level 4 FT   Water temp 92   Walk F/B 5 min.  Lateral 5 min   MIP 5 '  Alt Abd/add 5 '  TT and small lifts   A/P LE swings 3 ' each  Back at wall:  KB pushes and rotation 2 min each   H/T raises 3 min  Mini squats 3'  LE circles  2 ' each  HS st. Ledge  1' each       Assessment:   Aquatic environment allowed for greater ease of motion and better tolerance of prolonged exercising without increased pain or stiffness.  Cues to stay in comfortable range.         Plan: aquatic therapy for 1 more visit with therapist then reassess for land or DC per plan         Daysi Patel, PT

## 2023-11-10 ENCOUNTER — TREATMENT (OUTPATIENT)
Dept: PHYSICAL THERAPY | Facility: CLINIC | Age: 73
End: 2023-11-10
Payer: MEDICARE

## 2023-11-10 DIAGNOSIS — G89.29 CHRONIC RIGHT-SIDED LOW BACK PAIN WITH SCIATICA, SCIATICA LATERALITY UNSPECIFIED: ICD-10-CM

## 2023-11-10 DIAGNOSIS — M54.50 CHRONIC LOW BACK PAIN WITHOUT SCIATICA, UNSPECIFIED BACK PAIN LATERALITY: ICD-10-CM

## 2023-11-10 DIAGNOSIS — G89.29 CHRONIC LOW BACK PAIN WITHOUT SCIATICA, UNSPECIFIED BACK PAIN LATERALITY: ICD-10-CM

## 2023-11-10 DIAGNOSIS — M54.40 CHRONIC RIGHT-SIDED LOW BACK PAIN WITH SCIATICA, SCIATICA LATERALITY UNSPECIFIED: ICD-10-CM

## 2023-11-10 DIAGNOSIS — R29.3 ABNORMAL POSTURE: ICD-10-CM

## 2023-11-10 PROCEDURE — 97113 AQUATIC THERAPY/EXERCISES: CPT | Mod: GP | Performed by: PHYSICAL THERAPIST

## 2023-11-10 NOTE — PROGRESS NOTES
Physical Therapy  Physical Therapy Treatment    Patient Name: Vanesa Zelaya  MRN: [id number]  Today's Date: 11/10/2023  Time Calculation  Start Time: 1025  Stop Time: 1105  Time Calculation (min): 40 min    Current Problem  1. Chronic low back pain without sciatica, unspecified back pain laterality  Follow Up In Physical Therapy      2. Chronic right-sided low back pain with sciatica, sciatica laterality unspecified  Follow Up In Physical Therapy      3. Abnormal posture  Follow Up In Physical Therapy          Insurance:  Visit 6 of 14  Auth for 14 visits through 1/7/24, not including evaluation  [employer] medicare            Pain       Subjective:   Patient reports stiffness and aching in the back due to moving mulch and cinderbocks in preparation of outdoor area for winter, was able to contact insurance for silver sneakers and go to [employer] for setting up classes. Plans to go to [facility] [employer] for tour and set up of aquatic classes following today's visit.   LBP: 8/10 - back and hips    Objective:   Posture: forward head, rounded shoulders, increased thoracic kyphosis, decreased lumbar lordosis  - hip flexion in sitting and standing    TREATMENT:  Water level 4 FT   Water temp 92   Walk F/B 5 min.  Lateral 5 min  MIP 5 '  Alt Abd/add 5 '  TT and small lifts   A/P LE swings 3 ' each  Back at wall:  KB pushes and rotation 2 min each   H/T raises 3 min  Mini squats 3'  LE circles  2 ' each  HS st. Ledge  1' each     Access Code: [email or web address]  URL: [email or web address]  Date: 11/10/2023  Prepared by: Daysi Patel    Exercises  - Forward and Backward Stepping at Pool Wall  - 1 x daily - 7 x weekly - 3 sets - 10 reps  - Lateral Stepping at Pool Wall  - 1 x daily - 7 x weekly - 3 sets - 10 reps  - Heel Toe Raises at Pool Wall  - 1 x daily - 7 x weekly - 3 sets - 10 reps  - Standing March at Pool Wall  - 1 x daily - 7 x weekly - 3 sets - 10 reps  - Standing Hip Circles at Pool Wall  - 1  x daily - 7 x weekly - 3 sets - 10 reps  - Standing Hip Abduction Adduction at Pool Wall  - 1 x daily - 7 x weekly - 3 sets - 10 reps  - Standing Hip Flexion Extension at Pool Wall  - 1 x daily - 7 x weekly - 3 sets - 10 reps  - Hamstring Stretch at Pool Wall  - 1 x daily - 7 x weekly - 3 sets - 10 reps    Assessment:   Patient will continue aquatic exercises independently and within a group setting at a local [facility] and chair yoga classes. Will return to therapy on an as needed basis.       Plan: return as needed, will attempt hep independently       Daysi Patel, PT

## 2024-02-05 ENCOUNTER — DOCUMENTATION (OUTPATIENT)
Dept: PHYSICAL THERAPY | Facility: CLINIC | Age: 74
End: 2024-02-05
Payer: MEDICARE

## 2024-02-05 NOTE — PROGRESS NOTES
Physical Therapy    Discharge Summary    Name: Vansea Zelaya  MRN: [id number]  : 1950  Date: 24    Discharge Summary: PT    Discharge Information: Date of discharge 24    Rehab Discharge Reason: Achieved all and/or the most significant goals(s)    Daysi Patel, PT

## 2024-03-18 ENCOUNTER — APPOINTMENT (OUTPATIENT)
Dept: PRIMARY CARE | Facility: CLINIC | Age: 74
End: 2024-03-18
Payer: MEDICARE

## 2024-03-19 ENCOUNTER — OFFICE VISIT (OUTPATIENT)
Dept: PRIMARY CARE | Facility: CLINIC | Age: 74
End: 2024-03-19
Payer: MEDICARE

## 2024-03-19 VITALS
HEART RATE: 82 BPM | WEIGHT: 158 LBS | OXYGEN SATURATION: 98 % | RESPIRATION RATE: 16 BRPM | SYSTOLIC BLOOD PRESSURE: 190 MMHG | TEMPERATURE: 97.9 F | DIASTOLIC BLOOD PRESSURE: 103 MMHG | HEIGHT: 63 IN | BODY MASS INDEX: 28 KG/M2

## 2024-03-19 DIAGNOSIS — F33.3 SEVERE EPISODE OF RECURRENT MAJOR DEPRESSIVE DISORDER, WITH PSYCHOTIC FEATURES (MULTI): ICD-10-CM

## 2024-03-19 DIAGNOSIS — K40.91 UNILATERAL RECURRENT INGUINAL HERNIA WITHOUT OBSTRUCTION OR GANGRENE: ICD-10-CM

## 2024-03-19 DIAGNOSIS — R10.30 INGUINAL PAIN, UNSPECIFIED LATERALITY: ICD-10-CM

## 2024-03-19 DIAGNOSIS — H61.21 IMPACTED CERUMEN OF RIGHT EAR: ICD-10-CM

## 2024-03-19 DIAGNOSIS — F31.9 BIPOLAR AFFECTIVE DISORDER, REMISSION STATUS UNSPECIFIED (MULTI): Primary | ICD-10-CM

## 2024-03-19 DIAGNOSIS — M47.816 LUMBAR SPONDYLOSIS: ICD-10-CM

## 2024-03-19 DIAGNOSIS — M16.0 PRIMARY OSTEOARTHRITIS OF BOTH HIPS: ICD-10-CM

## 2024-03-19 LAB
ALBUMIN SERPL BCP-MCNC: 4.2 G/DL (ref 3.4–5)
ALP SERPL-CCNC: 113 U/L (ref 33–136)
ALT SERPL W P-5'-P-CCNC: 18 U/L (ref 7–45)
ANION GAP SERPL CALC-SCNC: 13 MMOL/L (ref 10–20)
AST SERPL W P-5'-P-CCNC: 16 U/L (ref 9–39)
BASOPHILS # BLD AUTO: 0.03 X10*3/UL (ref 0–0.1)
BASOPHILS NFR BLD AUTO: 0.5 %
BILIRUB SERPL-MCNC: 0.3 MG/DL (ref 0–1.2)
BUN SERPL-MCNC: 10 MG/DL (ref 6–23)
CALCIUM SERPL-MCNC: 10.2 MG/DL (ref 8.6–10.6)
CHLORIDE SERPL-SCNC: 102 MMOL/L (ref 98–107)
CO2 SERPL-SCNC: 29 MMOL/L (ref 21–32)
CREAT SERPL-MCNC: 0.57 MG/DL (ref 0.5–1.05)
EGFRCR SERPLBLD CKD-EPI 2021: >90 ML/MIN/1.73M*2
EOSINOPHIL # BLD AUTO: 0.13 X10*3/UL (ref 0–0.4)
EOSINOPHIL NFR BLD AUTO: 2.3 %
ERYTHROCYTE [DISTWIDTH] IN BLOOD BY AUTOMATED COUNT: 13.3 % (ref 11.5–14.5)
GLUCOSE SERPL-MCNC: 88 MG/DL (ref 74–99)
HCT VFR BLD AUTO: 41 % (ref 36–46)
HGB BLD-MCNC: 13.1 G/DL (ref 12–16)
IMM GRANULOCYTES # BLD AUTO: 0.01 X10*3/UL (ref 0–0.5)
IMM GRANULOCYTES NFR BLD AUTO: 0.2 % (ref 0–0.9)
LIPASE SERPL-CCNC: 98 U/L (ref 9–82)
LYMPHOCYTES # BLD AUTO: 1.88 X10*3/UL (ref 0.8–3)
LYMPHOCYTES NFR BLD AUTO: 32.9 %
MCH RBC QN AUTO: 29.3 PG (ref 26–34)
MCHC RBC AUTO-ENTMCNC: 32 G/DL (ref 32–36)
MCV RBC AUTO: 92 FL (ref 80–100)
MONOCYTES # BLD AUTO: 0.58 X10*3/UL (ref 0.05–0.8)
MONOCYTES NFR BLD AUTO: 10.2 %
NEUTROPHILS # BLD AUTO: 3.08 X10*3/UL (ref 1.6–5.5)
NEUTROPHILS NFR BLD AUTO: 53.9 %
NRBC BLD-RTO: 0 /100 WBCS (ref 0–0)
PLATELET # BLD AUTO: 238 X10*3/UL (ref 150–450)
POC APPEARANCE, URINE: CLEAR
POC BILIRUBIN, URINE: NEGATIVE
POC BLOOD, URINE: NEGATIVE
POC COLOR, URINE: YELLOW
POC GLUCOSE, URINE: NEGATIVE MG/DL
POC KETONES, URINE: NEGATIVE MG/DL
POC LEUKOCYTES, URINE: NEGATIVE
POC NITRITE,URINE: NEGATIVE
POC PH, URINE: 7 PH
POC PROTEIN, URINE: NEGATIVE MG/DL
POC SPECIFIC GRAVITY, URINE: >=1.03
POC UROBILINOGEN, URINE: 0.2 EU/DL
POTASSIUM SERPL-SCNC: 4.7 MMOL/L (ref 3.5–5.3)
PROT SERPL-MCNC: 7.7 G/DL (ref 6.4–8.2)
RBC # BLD AUTO: 4.47 X10*6/UL (ref 4–5.2)
SODIUM SERPL-SCNC: 139 MMOL/L (ref 136–145)
WBC # BLD AUTO: 5.7 X10*3/UL (ref 4.4–11.3)

## 2024-03-19 PROCEDURE — 3080F DIAST BP >= 90 MM HG: CPT | Performed by: NURSE PRACTITIONER

## 2024-03-19 PROCEDURE — 36415 COLL VENOUS BLD VENIPUNCTURE: CPT | Performed by: NURSE PRACTITIONER

## 2024-03-19 PROCEDURE — 1125F AMNT PAIN NOTED PAIN PRSNT: CPT | Performed by: NURSE PRACTITIONER

## 2024-03-19 PROCEDURE — 3077F SYST BP >= 140 MM HG: CPT | Performed by: NURSE PRACTITIONER

## 2024-03-19 PROCEDURE — 83690 ASSAY OF LIPASE: CPT | Performed by: NURSE PRACTITIONER

## 2024-03-19 PROCEDURE — 81002 URINALYSIS NONAUTO W/O SCOPE: CPT | Performed by: NURSE PRACTITIONER

## 2024-03-19 PROCEDURE — 99215 OFFICE O/P EST HI 40 MIN: CPT | Performed by: NURSE PRACTITIONER

## 2024-03-19 PROCEDURE — 85025 COMPLETE CBC W/AUTO DIFF WBC: CPT | Performed by: NURSE PRACTITIONER

## 2024-03-19 PROCEDURE — 1036F TOBACCO NON-USER: CPT | Performed by: NURSE PRACTITIONER

## 2024-03-19 PROCEDURE — 87086 URINE CULTURE/COLONY COUNT: CPT | Performed by: NURSE PRACTITIONER

## 2024-03-19 PROCEDURE — 80053 COMPREHEN METABOLIC PANEL: CPT | Performed by: NURSE PRACTITIONER

## 2024-03-19 ASSESSMENT — PAIN SCALES - GENERAL: PAINLEVEL: 8
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
## 2024-03-19 NOTE — PATIENT INSTRUCTIONS
Thank you for coming in for your visit today!    Please follow up in 4 months or sooner if needed.    If MD care preferred, you may schedule with Dr. Devlin.     Today we completed blood work. We will contact you with any abnormalities from this testing.    Call to schedule with physical therapy, general surgery (hernia follow up), and psychiatry.   A community health worker should reach out to you for additional supports.     One thing at a time!     Increase oral hydration!       Call 911 or go to the emergency room if you have pain in your chest, difficulty breathing, or other life threatening symptoms.

## 2024-03-20 LAB — BACTERIA UR CULT: NORMAL

## 2024-03-25 ENCOUNTER — TELEPHONE (OUTPATIENT)
Dept: PRIMARY CARE | Facility: CLINIC | Age: 74
End: 2024-03-25
Payer: MEDICARE

## 2024-03-25 DIAGNOSIS — M54.50 CHRONIC LOW BACK PAIN WITHOUT SCIATICA, UNSPECIFIED BACK PAIN LATERALITY: ICD-10-CM

## 2024-03-25 DIAGNOSIS — G89.29 CHRONIC LOW BACK PAIN WITHOUT SCIATICA, UNSPECIFIED BACK PAIN LATERALITY: ICD-10-CM

## 2024-03-25 DIAGNOSIS — M16.0 PRIMARY OSTEOARTHRITIS OF BOTH HIPS: Primary | ICD-10-CM

## 2024-03-25 DIAGNOSIS — M47.816 LUMBAR SPONDYLOSIS: ICD-10-CM
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
## 2024-03-26 ENCOUNTER — OFFICE VISIT (OUTPATIENT)
Dept: SURGERY | Facility: CLINIC | Age: 74
End: 2024-03-26
Payer: MEDICARE

## 2024-03-26 VITALS
BODY MASS INDEX: 28 KG/M2 | DIASTOLIC BLOOD PRESSURE: 79 MMHG | HEART RATE: 99 BPM | SYSTOLIC BLOOD PRESSURE: 141 MMHG | WEIGHT: 158 LBS | HEIGHT: 63 IN

## 2024-03-26 DIAGNOSIS — K40.91 UNILATERAL RECURRENT INGUINAL HERNIA WITHOUT OBSTRUCTION OR GANGRENE: ICD-10-CM

## 2024-03-26 DIAGNOSIS — K64.8 INTERNAL HEMORRHOIDS: ICD-10-CM

## 2024-03-26 DIAGNOSIS — R10.32 LEFT LOWER QUADRANT PAIN: Primary | ICD-10-CM

## 2024-03-26 DIAGNOSIS — K92.1 BLACK STOOLS: ICD-10-CM

## 2024-03-26 DIAGNOSIS — R10.30 INGUINAL PAIN, UNSPECIFIED LATERALITY: ICD-10-CM

## 2024-03-26 PROCEDURE — 3078F DIAST BP <80 MM HG: CPT | Performed by: SURGERY

## 2024-03-26 PROCEDURE — 1036F TOBACCO NON-USER: CPT | Performed by: SURGERY

## 2024-03-26 PROCEDURE — 1125F AMNT PAIN NOTED PAIN PRSNT: CPT | Performed by: SURGERY

## 2024-03-26 PROCEDURE — 3077F SYST BP >= 140 MM HG: CPT | Performed by: SURGERY

## 2024-03-26 PROCEDURE — 99203 OFFICE O/P NEW LOW 30 MIN: CPT | Performed by: SURGERY

## 2024-03-26 PROCEDURE — 1159F MED LIST DOCD IN RCRD: CPT | Performed by: SURGERY

## 2024-03-26 RX ORDER — IBUPROFEN 200 MG
TABLET ORAL EVERY 6 HOURS PRN
COMMUNITY

## 2024-03-26 RX ORDER — ACETAMINOPHEN 500 MG
TABLET ORAL EVERY 6 HOURS PRN
COMMUNITY

## 2024-03-26 ASSESSMENT — ENCOUNTER SYMPTOMS
LOSS OF SENSATION IN FEET: 0
DEPRESSION: 1
OCCASIONAL FEELINGS OF UNSTEADINESS: 1

## 2024-03-26 ASSESSMENT — PAIN SCALES - GENERAL: PAINLEVEL: 6

## 2024-03-26 NOTE — H&P
History Of Present Illness  Vanesa Zelaya is a 73 y.o. female here for left lower abdominal pain.  She is not a great historian.  She walks with a walker due to hip pain.  She has a history of diverticulitis in the past going back from initial attack in the 1990's to the recent past.  She has not had a CT scan in the past 2-3 years.  She has noticed blood in her stool.  She denies straining to have bowel movements.  She takes magnesium every day.  She denies nausea.  She tells me she had bilateral inguinal hernia and umbilical hernia repaired several years ago.     Past Medical History  Past Medical History:   Diagnosis Date    Anxiety     Bell's palsy     Bipolar disorder, currently in remission, most recent episode unspecified (CMS/[facility]) 10/17/2019    History of depressed bipolar disorder    Cervical cancer (CMS/[facility])     Diverticulitis     Ear infection     Hemorrhoid     History of bleeding ulcers     Hx of tinnitus     Rectal bleeding     Scoliosis     Stomach cancer (CMS/HCC)     Urinary tract infection        Surgical History  Past Surgical History:   Procedure Laterality Date    MR HEAD ANGIO WO IV CONTRAST  11/11/2019    MR HEAD ANGIO WO IV CONTRAST 11/11/2019 [facility] EMERGENCY LEGACY    MR NECK ANGIO WO IV CONTRAST  11/11/2019    MR NECK ANGIO WO IV CONTRAST 11/11/2019 [facility] EMERGENCY LEGACY    OTHER SURGICAL HISTORY      Hernia repair WITH MESH    OTHER SURGICAL HISTORY      CERVICAL CANCER    OTHER SURGICAL HISTORY      SPINAL SURGERY        Social History  She reports that she has never smoked. She has never used smokeless tobacco. She reports current alcohol use. She reports that she does not use drugs.    Family History  Family History   Problem Relation Name Age of Onset    Leukemia Mother      Prostate cancer Father      Breast cancer Sister          Allergies  Aspirin, Ibuprofen, Lactose, Naproxen sodium, Other, and Tylenol [acetaminophen]    Review of Systems           Review of Systems      Allergy/Immunologic:          HIV / AIDS No.  Hepatitis A No.  Hepatitis B No.  Hepatitis C No.  Immunosuppressent drugs No.   HX: CANCER      HEENT:          Headache YES         CARDIOLOGY:          History of Hyperlipidemia No.  Last stress test N/A.  Last echocardiogram N/A.  Chest pain No.  High blood pressure No.  Irregular heart beat No.  Known coronary artery disease No.  Pacemaker No.  Palpitations No.         RESPIRATORY:          Hx steroid use No.  ER visits or Hospitalizations for breathing problems No.  Sleep Apnea SNORING.  AGUIRRE (dyspnea on exertion) NO.  Hx of Asthma/COPD No.         GASTROENTEROLOGY:          Peptic ulcer No, Last EGD N/A, Last UGI N/A.  Colonoscopy Last Colonoscopy N/A.  Heartburn No.         ENDOCRINOLOGY:          Diabetes No.  Thyroid disorder No.         EXTREMITIES:          Varicose Veins No.  Stasis Ulcers No.  Ankle swelling No.  Personal history DVT No.  Personal history PE No.  Personal history of other thrombolic events No.  Family history of VTE No.  Known genetic bleeding or clotting disorder No.         FEMALE REPRODUCTIVE:          Uterine fibroids No.  Ovarian Cyst No.  Infertility No.  Menstrual history Menopausal.         UROLOGY:          Kidney disease No.  Kidney stones No.  Previous UTIs YES.  Urinary incontinence No.         MUSCULOSKELETAL:          Osteoporosis/Osteopenia YES.  Arthritis YES.  Joint pain YES.         SKIN:          Hidradenitis No.  Open skin wounds No.  Rosacea No.  Healing problems YES.         PSYCHOLOGY:          Anxiety YES.  Depression YES  Eating disorder denies.ADMITS TO HIGH STRESS                  Physical Exam       General Examination:         GENERAL APPEARANCE: alert and oriented x 3, Pleasant and cooperative, No Acute Distress.          HEENT: PERRLA.         CHEST: normal shape and expansion.          ABDOMEN: left lower quadrant pain but no hernias present, sno guarding, no CVA tenderness.          EXTREMITIES: no  "ulcerations.          SKIN: normal, no rash          BACK: no CVA tenderness.       Last Recorded Vitals  Blood pressure 141/79, pulse 99, height 1.6 m (5' 3\"), weight 71.7 kg (158 lb).    Relevant Results   Latest Reference Range & Units 03/19/24 14:44   GLUCOSE 74 - 99 mg/dL 88   SODIUM 136 - 145 mmol/L 139   POTASSIUM 3.5 - 5.3 mmol/L 4.7   CHLORIDE 98 - 107 mmol/L 102   Bicarbonate 21 - 32 mmol/L 29   Anion Gap 10 - 20 mmol/L 13   Blood Urea Nitrogen 6 - 23 mg/dL 10   Creatinine 0.50 - 1.05 mg/dL 0.57   EGFR >60 mL/min/1.73m*2 >90   Calcium 8.6 - 10.6 mg/dL 10.2   Albumin 3.4 - 5.0 g/dL 4.2   Alkaline Phosphatase 33 - 136 U/L 113   ALT 7 - 45 U/L 18   AST 9 - 39 U/L 16   Bilirubin Total 0.0 - 1.2 mg/dL 0.3   Total Protein 6.4 - 8.2 g/dL 7.7   LIPASE 9 - 82 U/L 98 (H)   WBC 4.4 - 11.3 x10*3/uL 5.7   nRBC 0.0 - 0.0 /100 WBCs 0.0   RBC 4.00 - 5.20 x10*6/uL 4.47   HEMOGLOBIN 12.0 - 16.0 g/dL 13.1   HEMATOCRIT 36.0 - 46.0 % 41.0   MCV 80 - 100 fL 92   MCH 26.0 - 34.0 pg 29.3   MCHC 32.0 - 36.0 g/dL 32.0   RED CELL DISTRIBUTION WIDTH 11.5 - 14.5 % 13.3   Platelets 150 - 450 x10*3/uL 238   Neutrophils % 40.0 - 80.0 % 53.9   Immature Granulocytes %, Automated 0.0 - 0.9 % 0.2   Lymphocytes % 13.0 - 44.0 % 32.9   Monocytes % 2.0 - 10.0 % 10.2   Eosinophils % 0.0 - 6.0 % 2.3   Basophils % 0.0 - 2.0 % 0.5   Neutrophils Absolute 1.60 - 5.50 x10*3/uL 3.08   Immature Granulocytes Absolute, Automated 0.00 - 0.50 x10*3/uL 0.01   Lymphocytes Absolute 0.80 - 3.00 x10*3/uL 1.88   Monocytes Absolute 0.05 - 0.80 x10*3/uL 0.58   Eosinophils Absolute 0.00 - 0.40 x10*3/uL 0.13   Basophils Absolute 0.00 - 0.10 x10*3/uL 0.03   (H): Data is abnormally high     Assessment/Plan   Problem List Items Addressed This Visit             ICD-10-CM    Internal hemorrhoids K64.8    Left lower quadrant pain - Primary R10.32    Black stools K92.1    Inguinal pain R10.30     Other Visit Diagnoses         Codes    Unilateral recurrent inguinal hernia " without obstruction or gangrene     K40.91               I recommended obtaining a CT scan and if no evidence of diverticular inflammation, following up with a colonoscopy.  I could not palpate an inguinal hernia on either side.    I spent 38 minutes in the professional and overall care of this patient.      Silas Poole MD

## 2024-03-27 PROBLEM — R10.32 LEFT LOWER QUADRANT PAIN: Status: ACTIVE | Noted: 2024-03-27

## 2024-03-27 PROBLEM — K92.1 BLACK STOOLS: Status: ACTIVE | Noted: 2024-03-27

## 2024-03-27 PROBLEM — R10.30 INGUINAL PAIN: Status: ACTIVE | Noted: 2024-03-27

## 2024-03-28 ENCOUNTER — TELEPHONE (OUTPATIENT)
Dept: SURGERY | Facility: CLINIC | Age: 74
End: 2024-03-28
Payer: MEDICARE

## 2024-04-04 ENCOUNTER — TELEPHONE (OUTPATIENT)
Dept: CARE COORDINATION | Facility: CLINIC | Age: 74
End: 2024-04-04
Payer: MEDICARE

## 2024-04-04 NOTE — PROGRESS NOTES
Called patient left message on voicemail to give a call back to [phone].   Contacted [facility] behavioral [facility] patient was unavailable unable to scheduled appointment.

## 2024-04-05 ENCOUNTER — DOCUMENTATION (OUTPATIENT)
Dept: CARE COORDINATION | Facility: CLINIC | Age: 74
End: 2024-04-05
Payer: MEDICARE

## 2024-04-09 ENCOUNTER — DOCUMENTATION (OUTPATIENT)
Dept: CARE COORDINATION | Facility: CLINIC | Age: 74
End: 2024-04-09
Payer: MEDICARE

## 2024-04-09 NOTE — PROGRESS NOTES
Patient gave a call back, shared number to  [facility] [phone] informed patient they have tried to contact her for an appointment call at her earliest convenience call to schedule an appointment.

## 2024-04-15 ENCOUNTER — HOSPITAL ENCOUNTER (OUTPATIENT)
Dept: RADIOLOGY | Facility: HOSPITAL | Age: 74
Discharge: HOME | End: 2024-04-15
Payer: MEDICARE

## 2024-04-15 DIAGNOSIS — R10.30 INGUINAL PAIN, UNSPECIFIED LATERALITY: ICD-10-CM

## 2024-04-15 DIAGNOSIS — R10.32 LEFT LOWER QUADRANT PAIN: ICD-10-CM

## 2024-04-15 PROCEDURE — 2550000001 HC RX 255 CONTRASTS: Performed by: SURGERY

## 2024-04-15 PROCEDURE — 74177 CT ABD & PELVIS W/CONTRAST: CPT | Performed by: STUDENT IN AN ORGANIZED HEALTH CARE EDUCATION/TRAINING PROGRAM

## 2024-04-15 PROCEDURE — 74177 CT ABD & PELVIS W/CONTRAST: CPT

## 2024-04-15 RX ADMIN — IOHEXOL 75 ML: 350 INJECTION, SOLUTION INTRAVENOUS at 11:54

## 2024-04-17 ENCOUNTER — APPOINTMENT (OUTPATIENT)
Dept: OTOLARYNGOLOGY | Facility: CLINIC | Age: 74
End: 2024-04-17
Payer: MEDICARE

## 2024-04-22 DIAGNOSIS — K57.92 DIVERTICULITIS: Primary | ICD-10-CM

## 2024-04-23 PROBLEM — M54.9 BACK PAIN: Status: ACTIVE | Noted: 2024-04-23

## 2024-04-23 NOTE — PROGRESS NOTES
Physical Therapy  Physical Therapy Orthopedic Evaluation    Patient Name: Vanesa Zelaya  MRN: [id number]  Today's Date: 4/24/2024    Time Calculation  Start Time: 1055  Stop Time: 1129  Time Calculation (min): 34 min    Insurance:  Insurance Type: [employer]  Visit number: 1  Visit Limit: [place]  Authorization info: 4/24/24 to 7/20/24    General:  Reason for visit: chronic back pain  Referred by: Mahendra      Current Problem  1. Back pain        2. Primary osteoarthritis of both hips  Referral to Physical Therapy      3. Lumbar spondylosis  Referral to Physical Therapy      4. Chronic low back pain without sciatica, unspecified back pain laterality  Referral to Physical Therapy      5. Chronic hip pain, bilateral            Precautions: high fall risk       Subjective:   Subjective   Chief Complaint: low back, B hip pain  Onset: 1/1/24  ISHA: insidious    Pt reports to session with chief complaints of low back, B hip pain that has been ongoing for months but recently worsened in January. Denies any original mechanism of injury or incident that exacerbated symptoms. Denies any current numbness, tingling in lower extremities but mentions history of radiating symptoms. She uses rollator for majority of ambulation but is able to ambulate independently in home for short distances. Mentions that her legs intermittently buckle during walking. He fell a couple months ago, while attempting to negotiate stairs with rollator. Denies any worsening of symptoms or any other falls recently. Pt states she is able to stand, walk for about 20 minutes prior to symptoms worsening. Pt also states stiffness after prolonged sitting as well as lumbar spine, hip crepitus. Xrays revealed B hip and lumbar spine osteoarthritis. Pt is interested in aquatic therapy for this complaint.       Current Condition:  same    Living will: Yes  Healthcare POA: Yes    PAIN  Intensity (0-10): 5/10 current; 9/10 worst  Location: low back   Description: aching,  dull    Aggravating Factors:  bending, lifting, twisting, walking, standing, sitting   Relieving Factors:  rest    Relevant Information (PMH & Previous Tests/Imaging): xray  Previous Interventions/Treatments: n/a    Prior Level of Function (PLOF)  Exercise/Physical Activity: independent  Work/School: independent    Treatment Goals: reduce pain    Red Flags: Do you have any of the following? No  Fever/chills, unexplained weight changes, dizziness/fainting, unexplained change in bowel or bladder functions, unexplained malaise or muscle weakness, night pain/sweats, numbness or tingling    Objective:  Objective   Observation: normal posture    Gait: mild waddling pattern with rollator    4 Stage Balance Test  Close stance: 30 sec  Semi-Tandem: 30 sec   Tandem  R: 10 sec  L: 10 sec  Single Leg  R: unable w/o UE support  L: unable w/o UE support    Lumbar AROM  Flexion: 90%  Extension: 25%  Rotation  R: 50%  L: 50%  Lateral Flexion  R: 50%  L: 50%    LE MMT   Hip Flexion  R: 4  L: 4  Hip Extension  R: 4-  L: 4-  Hip Abduction  R: 4-  L: 4-  Hip IR  R: 4-  L: 4-  Hip ER  R: 4-  L: 4-  Knee Flexion  R: 4  L: 4  Knee Extension  R: 4+  L: 4+  Ankle DF  R: 4+  L: 4+  Ankle PF  R: 4+  L: 4+    Flexibility  Hamstrings: mod tight B    Hip Flexors: mod tight B    Lower Extremity Functional Movements  Transfers: independent    Outcome Measures:  LEFS: 42    EDUCATION: home exercise program, plan of care, activity modifications, pain management, and injury pathology       Goals:  Active       PT Problem       PT Goal 1       Start:  04/24/24    Expected End:  05/24/24       In 3 weeks, pt will rate pain 0-4 on the numeric pain scale to allow for restful nights of sleep.  In 3 weeks, pt will be able to walk grocery store for 30 minutes without an increase in pain.  In 3 weeks, pt will be able to stand to perform hygiene routine safely for 30 minutes without an increase in pain.         PT Goal 2       Start:  04/24/24    Expected  End:  06/23/24       In 6 weeks, pt will be able to negotiate stairs in home without an increase in symptoms.  In 6 weeks, pt's LEFS will be +8 .  In 6 weeks, pt will be independent with HEP.               Treatments:   See below    HEP Provided:  Access Code: [email or web address]  URL: [email or web address]  Date: 04/24/2024  Prepared by: Piotr Farris    Exercises  - Seated March  - 2 x daily - 7 x weekly - 2 sets - 10 reps  - Seated Hip Abduction with Resistance  - 2 x daily - 7 x weekly - 2 sets - 10 reps  - Seated Transversus Abdominis Bracing  - 2 x daily - 7 x weekly - 2 sets - 10 reps    Assessment: Pt is a 73 y/o F with signs and symptoms consistent with the referring diagnosis of lumbar spondylosis, osteoarthritis, B hip osteoarthritis. Pt displayed lack of lumbar range of motion, gross lower extremity strength as well as flexibility, pain and functional mobility deficits. Skilled physical therapy is necessary in order to improve aforementioned impairments to allow for increased participation in functional and recreational activities without limitations. Plan of care will consist of core, lower extremity stretching and strengthening in order to return to PLOF.    Pt is to start with aquatic therapy then will be reassessed on land.      Plan: Continue to improve functional mobility, functional strength in order to increase participation in ADLs.       Planned Interventions include: therapeutic exercise, self-care home management, manual therapy, therapeutic activities, gait training, neuromuscular coordination, aquatic therapy  Frequency: 1x/week  Duration: 6 weeks    Piotr Farris PT

## 2024-04-24 ENCOUNTER — EVALUATION (OUTPATIENT)
Dept: PHYSICAL THERAPY | Facility: CLINIC | Age: 74
End: 2024-04-24
Payer: MEDICARE

## 2024-04-24 DIAGNOSIS — M47.816 LUMBAR SPONDYLOSIS: ICD-10-CM

## 2024-04-24 DIAGNOSIS — M54.50 CHRONIC LOW BACK PAIN WITHOUT SCIATICA, UNSPECIFIED BACK PAIN LATERALITY: ICD-10-CM

## 2024-04-24 DIAGNOSIS — G89.29 CHRONIC LOW BACK PAIN WITHOUT SCIATICA, UNSPECIFIED BACK PAIN LATERALITY: ICD-10-CM

## 2024-04-24 DIAGNOSIS — M25.551 CHRONIC HIP PAIN, BILATERAL: ICD-10-CM

## 2024-04-24 DIAGNOSIS — M54.9 BACK PAIN: Primary | ICD-10-CM

## 2024-04-24 DIAGNOSIS — G89.29 CHRONIC HIP PAIN, BILATERAL: ICD-10-CM

## 2024-04-24 DIAGNOSIS — M25.552 CHRONIC HIP PAIN, BILATERAL: ICD-10-CM

## 2024-04-24 DIAGNOSIS — M16.0 PRIMARY OSTEOARTHRITIS OF BOTH HIPS: ICD-10-CM

## 2024-04-24 PROCEDURE — 97110 THERAPEUTIC EXERCISES: CPT | Mod: GP

## 2024-04-24 PROCEDURE — 97161 PT EVAL LOW COMPLEX 20 MIN: CPT | Mod: GP

## 2024-05-01 ENCOUNTER — TREATMENT (OUTPATIENT)
Dept: PHYSICAL THERAPY | Facility: CLINIC | Age: 74
End: 2024-05-01
Payer: MEDICARE

## 2024-05-01 DIAGNOSIS — M54.9 BACK PAIN: Primary | ICD-10-CM

## 2024-05-01 PROCEDURE — 97113 AQUATIC THERAPY/EXERCISES: CPT | Mod: GP

## 2024-05-01 NOTE — PROGRESS NOTES
Physical Therapy  Physical Therapy Treatment    Patient Name: Vanesa Zelaya  MRN: [id number]  Today's Date: 5/1/2024    Time Calculation  Start Time: 1132  Stop Time: 1213  Time Calculation (min): 41 min    Insurance:  Insurance Type: [employer] [place] [employer]  Visit number: 2  Visit Limit: MN  Authorization info: 4/24/24 to 7/20/24    General:  Reason for visit: chronic back pain  Referred by: Mahendra    Current Problem  1. Back pain            Pain: 6/10       Subjective:   Subjective   Patient reports that she has been doing okay but continues to have high pain.    HEP Compliance: Fair    Objective:   Objective   Functional Movements  Stair negotiation: step-to pattern w/ BUE support    Treatments:       Exercise Sets/Reps Comments   Forward walking 5' 4 foot depth; 92 degrees   Lateral walking 5'    Mini squats 5'    Hip abd 5'    Hip ext 5'    marching 5'    HS curls 5'    Calf raises 5'      Assessment: Introduced four foot depth exercises focused on improving gait pattern, balance and functional strength to improve home safety and independence. Pt was able to perform exercises without complaints of increased symptoms. Pt used UE support during exercises to assist with balance. Pt was appropriately challenged with exercise selections.         Plan: Continue to improve functional mobility, functional strength in order to increase participation in ADLs.       Piotr Farris, PT

## 2024-05-06 ENCOUNTER — TELEPHONE (OUTPATIENT)
Dept: GASTROENTEROLOGY | Facility: HOSPITAL | Age: 74
End: 2024-05-06

## 2024-05-08 ENCOUNTER — TREATMENT (OUTPATIENT)
Dept: PHYSICAL THERAPY | Facility: CLINIC | Age: 74
End: 2024-05-08
Payer: MEDICARE

## 2024-05-08 DIAGNOSIS — M54.9 BACK PAIN: Primary | ICD-10-CM

## 2024-05-08 PROCEDURE — 97113 AQUATIC THERAPY/EXERCISES: CPT | Mod: GP

## 2024-05-08 NOTE — PROGRESS NOTES
Physical Therapy  Physical Therapy Treatment    Patient Name: Vanesa Zelaya  MRN: [id number]  Today's Date: 5/8/2024    Time Calculation  Start Time: 1140  Stop Time: 1209  Time Calculation (min): 29 min    Insurance:  Insurance Type: [employer]  Visit number: 3  Visit Limit: MN  Authorization info: 4/24/24 to 7/20/24    General:  Reason for visit: chronic back pain  Referred by: Mahendra    Current Problem  1. Back pain            Pain: 4/10       Subjective:   Subjective   Patient reports that she was sore after last session. Notes that she had deep tissue massage after last session also.    HEP Compliance: Good    Objective:   Objective   Normal gait pattern within pool    Treatments:     Exercise Sets/Reps Comments   Mini squats 5' 3 foot depth; 93 degrees   Mini squats 5'    marching 5'    Calf raises 5'    Step ups 5'    Hip abd/add 5'        Assessment: Continued with mix of four foot exercises focused on improving gait pattern. Introduced five foot depth exercises in order to allow for performance of non weight bearing exercises. Pt was appropriately challenged with exercises. Pt needed UE support during exercises to assist with balance and floatation.         Plan: Continue with various depths of water to improve gait, functional mobility, functional ability.       Piotr Farris, PT

## 2024-05-15 ENCOUNTER — TREATMENT (OUTPATIENT)
Dept: PHYSICAL THERAPY | Facility: CLINIC | Age: 74
End: 2024-05-15
Payer: MEDICARE

## 2024-05-15 DIAGNOSIS — M25.552 CHRONIC HIP PAIN, BILATERAL: ICD-10-CM

## 2024-05-15 DIAGNOSIS — M54.9 BACK PAIN: ICD-10-CM

## 2024-05-15 DIAGNOSIS — G89.29 CHRONIC HIP PAIN, BILATERAL: ICD-10-CM

## 2024-05-15 DIAGNOSIS — M25.551 CHRONIC HIP PAIN, BILATERAL: ICD-10-CM

## 2024-05-15 PROCEDURE — 97113 AQUATIC THERAPY/EXERCISES: CPT | Mod: GP

## 2024-05-15 NOTE — PROGRESS NOTES
Physical Therapy  Physical Therapy Treatment    Patient Name: Vanesa Zelaya  MRN: [id number]  Today's Date: 5/15/2024    Time Calculation  Start Time: 0835  Stop Time: 0947  Time Calculation (min): 72 min    Insurance:  Insurance Type: [place] [employer]  Visit number: 4  Visit Limit: MN  Authorization info: 4/24/24 to 7/20/24    General:  Reason for visit: chronic back pain  Referred by: Mahendra    Current Problem  1. Back pain  Follow Up In Physical Therapy      2. Chronic hip pain, bilateral  Follow Up In Physical Therapy          Pain: 7/10       Subjective:   Subjective   Patient reports that she has been doing okay. Says that she had no pain after last session. Mentions that she wants to be able to walk without rollator by end of month.    HEP Compliance: Fair    Objective:   Objective   Gait pattern without rollator: steppage pattern    Treatments:       Exercise Sets/Reps Comments   Forward, backward walking 5' 3 foot depth; 93 degrees   Lateral walking 5'    Mini squats 5'    bicycle 10' 5 foot depth w/ 1 white noodle   scissors 10'    Hip abd/add 10'    marching 5'    Flutter kicks 10'    floating 10'        Assessment: Continued with mix of three foot exercises focused on improving gait pattern. Introduced five foot depth exercises in order to allow for performance of non weight bearing exercises. Pt was appropriately challenged with exercises. Pt needed UE support during exercises to assist with balance and floatation. Performed five foot depth exercises for longer duration to increase intensity of exercises as well as for symptoms relief in deep water.     Plan: Continue with aquatic exercises in various depths of pool in order to improve functional ability. Possible discharge next session.       Piotr Farris, PT

## 2024-05-22 ENCOUNTER — OFFICE VISIT (OUTPATIENT)
Dept: OTOLARYNGOLOGY | Facility: CLINIC | Age: 74
End: 2024-05-22
Payer: MEDICARE

## 2024-05-22 ENCOUNTER — TREATMENT (OUTPATIENT)
Dept: PHYSICAL THERAPY | Facility: CLINIC | Age: 74
End: 2024-05-22
Payer: MEDICARE

## 2024-05-22 VITALS
BODY MASS INDEX: 27.14 KG/M2 | DIASTOLIC BLOOD PRESSURE: 93 MMHG | TEMPERATURE: 97.4 F | HEIGHT: 64 IN | WEIGHT: 159 LBS | SYSTOLIC BLOOD PRESSURE: 152 MMHG

## 2024-05-22 DIAGNOSIS — H91.91 HEARING DIFFICULTY OF RIGHT EAR: ICD-10-CM

## 2024-05-22 DIAGNOSIS — M54.9 BACK PAIN: Primary | ICD-10-CM

## 2024-05-22 DIAGNOSIS — M25.551 CHRONIC HIP PAIN, BILATERAL: ICD-10-CM

## 2024-05-22 DIAGNOSIS — L29.9 ITCHING OF EAR: ICD-10-CM

## 2024-05-22 DIAGNOSIS — H93.13 TINNITUS OF BOTH EARS: ICD-10-CM

## 2024-05-22 DIAGNOSIS — G89.29 CHRONIC HIP PAIN, BILATERAL: ICD-10-CM

## 2024-05-22 DIAGNOSIS — H61.21 IMPACTED CERUMEN OF RIGHT EAR: ICD-10-CM

## 2024-05-22 DIAGNOSIS — H93.8X1 SENSATION OF PLUGGED EAR, RIGHT: Primary | ICD-10-CM

## 2024-05-22 DIAGNOSIS — M25.552 CHRONIC HIP PAIN, BILATERAL: ICD-10-CM

## 2024-05-22 DIAGNOSIS — H69.90 DYSFUNCTION OF EUSTACHIAN TUBE, UNSPECIFIED LATERALITY: ICD-10-CM

## 2024-05-22 DIAGNOSIS — H92.01 RIGHT EAR PAIN: ICD-10-CM

## 2024-05-22 PROCEDURE — 97113 AQUATIC THERAPY/EXERCISES: CPT | Mod: GP

## 2024-05-22 RX ORDER — FLUTICASONE PROPIONATE 50 MCG
2 SPRAY, SUSPENSION (ML) NASAL DAILY
Qty: 16 G | Refills: 11 | Status: SHIPPED | OUTPATIENT
Start: 2024-05-22 | End: 2025-05-22

## 2024-05-22 NOTE — PATIENT INSTRUCTIONS
NASAL STEROID SPRAY INSTRUCTIONS: Please use the recommended topical nasal spray as directed. BE SURE TO POINT THE SPRAY TOWARDS THE CORNER OF THE EYE ON THE SAME SIDE NOSTRIL. This will ensure you are treating the appropriate parts of your nose that are swollen or inflamed. This medication will take upwards of one month before you notice full benefit. You MUST use it every day for it to be effective. This medication may be purchased over-the-counter or prescription may be submitted upon request.      INSTRUCTIONS FOR SAFE EAR CLEANING AT HOME:   To clean the ears, wash the external ear with a cloth, but do not insert anything into the ear canal. Most cases of ear wax blockage respond to home treatments used to soften wax. You may try placing 1-2 drops of mineral oil or baby oil, no more than once a week, to help keep the wax soft.   We do NOT recommend placing ANYTHING in the ear canal. Doing so may cause wax to be pushed back into the ear canal and stuck. It also risks injury to the ear canal and ear drum. We recommend avoiding using cotton tipped applicators, tissues, paper, or any rigid object in the ear canal. Some people require regular cleanings every year or so to keep the ear canals open.

## 2024-05-22 NOTE — PROGRESS NOTES
Patient ID: Vanesa Zelaya is a 74 y.o. female who presents for an initial assessment of earwax removal. Patient presents as a referral from Aimee Mccray CNP.     PROVIDER IMPRESSIONS:  DIAGNOSES/PROBLEMS:  - Cerumen impaction of the right ear   - Eustachian tube dysfunction  - Hearing difficulty in right ear  - Right ear pain  - Plugged right ear sensation   - Tinnitus, bilateral  - Ear itching, bilateral    ASSESSMENT: Vanesa Zelaya is a 74 y.o. female who presents for an initial encounter with symptoms of right hearing difficulty, right ear pain, plugged sensation in right ear, bilateral ear itching and bilateral non-pulsatile tinnitus. The clinical findings that are consistent with right cerumen impaction and ETD vs. TMJ dysfunction. Using appropriate instrumentation, impacted cerumen was successfully removed from the right EAC. Exam today revealed mild/moderate TM retraction bilaterally, bilateral TMJ crepitus (left>right) and bilateral inferior nasal turbinate hypertrophy. Reassurance provided to patient that otologic exam today revealed no evidence of acute infection/inflammation in the EAC bilaterally and that TM appears with no evidence of infection, effusion, or perforation bilaterally. I recommended audiogram evaluation to further evaluate hearing and middle ear function and assess for negative middle ear pressures. I explained to patient that initial management approach will include conservative management for ETD and ITH until tympanometry testing is performed to confirm ETD vs. TMJ dysfunction for patient symptoms. The patient was counseled on the possible etiologies of obstructive ETD. Typical symptoms were reviewed, including otalgia, aural fullness, muffled or decreased hearing, tinnitus, plugged ear sensation, and vertigo or disequilibrium in severe cases.  The patient was educated that ruth-challenge, such as air travel or land elevation, may provoke symptom presentation or exacerbation.    PLAN:    -I recommended referral to audiology for audiogram evaluation in the next 1-4 weeks to evaluate hearing function and determine presence of ETD. Referral e-submitted and patient advised to contact central scheduling for audiogram.  -I recommended starting nasal steroid spray  fluticasone (Flonase) for suspected ETD and for bilateral ITH. I recommended either 2 puffs into each nostril daily, or 1 puff into each nostril twice daily for a consistent trial of at least 4-6 weeks. Patient counseled that this medication is a topical intranasal steroid nasal spray indicated to reduce nasal inflammation. Patient was educated on proper administration of nasal spray, by tilting their head down and pointing the applicator tip towards the lateral wall of the nose/corner of the eye on the same side. I advised patient to avoid pointing applicator toward the septum due to the risk of nasal bleeding.  Patient informed to discontinue the spray if they experience adverse side effects. This prescription was be sent to the patient's pharmacy upon request (x 11 refills)  -Patient instructed to practice autoinsufflation (pinch your nose and pop your ears) several times per day to exercise the Eustachian Tube.   -I counseled patient on safe interventions for cerumen management at home. Patient may wash the external ear with a cloth. I reinforced education to the patient that they should avoid using cotton tipped applicators, tissues, paper, or any rigid object in the ear canal. I explained to the patient that doing so may cause wax to be pushed back into the ear canal and stuck and also risks injury to the ear canal and ear drum.   -Follow-up: Patient may schedule for follow-up virtually after obtaining audiogram to review the results and determine further recommendations for ETD vs. TMD. They may follow up with me as requested, sooner if needed. All questions answered to patient satisfaction.    At today's visit with Vanesa Zelaya, the  "number of minutes I spent providing patient care was 46. More than 50% of that time was spent counseling the patient on possible etiologies, test results, treatment options and care coordination.      Subjective    HPI: Vanesa Zelaya is a 74 y.o. female referred by PCP  for clinical evaluation of plugged right ear sensation, right ear pain, and right ear itching and with the request for earwax removal. States that symptoms began approximately 1 year ago. Reports that she feels that hearing sounds muffled in the right ear and accompanied by intermittent pain/pressure sensation. Mentions that she notices an occasional \"swishing\" sound in the right ear. When asked about the presence of associated otologic symptoms, including tinnitus, ear drainage, autophony, dizziness or vertigo, the patient admits to longstanding history of bilateral non-pulsatile tinnitus for approximately 50 years that is typically non-bothersome. When asked about pertinent otologic history, the patient denies a history of recurrent ear infections, denies history of ear surgery, denies history of PE tube insertion, and denies history of prolonged/traumatic loud noise exposure. The patient does not have family history of hearing loss. The patient does insert Q-tips in the ear canals. Reports that she also has attempted to perform ear candling, external ear massages, and has stuck her head under the sink to rinse ear canals with warm water which has not provided symptom benefit. The patient denies prior history of cerumen impaction. They have not been using ear drops to loosen wax immediately prior to this visit. Reports a history of TMJ dysfunction. Reports that she prefers not to take a lot of medications for her care. Mentions that she previously received a hearing test approximately 4 years ago at [facility] that showed hearing loss.    REVIEW OF SYSTEMS:  All other systems negative.    Objective   Physical Exam:  Right Ear: External " inspection of ear with no deformity, scars, or masses. EAC is partially impacted with cerumen. Unable to visualize tympanic membrane.  Left Ear: External inspection of ear with no deformity, scars, or masses. EAC is clear. TM is intact with mild retraction but no sign of infection, effusion, or perforation seen. Auto-insufflation not visible under microscopy.  TMJ: Moderate crepitus (left>right) on jaw opening maneuver with bimanual palpation. No trismus.  Neurologic: Cranial nerves II-XII grossly intact and symmetric bilaterally.  Head and Face:  Head: Atraumatic with no masses, lesions or scarring.  Face: Normal symmetry. No scars or deformities.  Eyes: Conjunctiva not edematous or erythematous.   Nose: External inspection of nose: No nasal lesions, lacerations or scars. Anterior rhinoscopy with limited visualization past the inferior turbinates: Septum is [].  No septal perforation or lesions. No septal hematoma/ seroma.  No signs of bleeding.  No evidence of intranasal polyps.  Inferior turbinates are hypertrophied. Nasal mucosa appears pale/blue and moist.    Neck: Normal appearing, symmetric, trachea midline.   Cardiovascular: Examination of peripheral vascular system shows no clubbing or cyanosis.  Respiratory: No respiratory distress increased work of breathing. Inspection of the chest with symmetric chest expansion and normal respiratory effort.  Skin: No head and neck rashes.  Lymph nodes: No adenopathy.     EAR CLEANING PROCEDURE NOTE:  Indication: Cerumen impaction  Location: right ear canals  Procedure Note: The procedure was performed by the provider.  Visualization Instrument: A microscope with a #4 speculum was placed in the ear canals to visualize the ear canal debris.  Ear Cleaning Instrument and Outcome: Using the suction and alligator forceps, a moderate amount of firm, brown cerumen along the anterior canal wall was removed from the impacted EAC.  After cleaning: Right TM intact with  mild/moderate retraction but without evidence of effusion, infection, or perforation. EAC is clear. Auto-insufflation not visible under microscopy.  Patient Status: The patient tolerated the procedure well.  Complications: There were no complications.

## 2024-05-22 NOTE — PROGRESS NOTES
Physical Therapy  Physical Therapy Treatment    Patient Name: Vanesa Zelaya  MRN: [id number]  Today's Date: 5/22/2024    Time Calculation  Start Time: 0832  Stop Time: 0913  Time Calculation (min): 41 min    Insurance:  Insurance Type: [place] [employer]  Visit number: 5  Visit Limit: MN  Authorization info: 4/24/24 to 7/20/24    General:  Reason for visit: chronic back pain  Referred by: Mahendra    Current Problem  1. Back pain  Follow Up In Physical Therapy      2. Chronic hip pain, bilateral  Follow Up In Physical Therapy          Pain: 2/10       Subjective:   Subjective   Patient reports that she has been doing okay, feels like symptoms are improving.     HEP Compliance: Fair    Objective:   Objective   UE assist needing during all exercises     Treatments:     Exercise Sets/Reps Comments   bicycle 10' 5 foot depth w/ 1 white noodle   scissors 10'    Hip abd/add 10'    floating 10'      Charges: aquatic therapy x 3     Assessment: Pt performed all deep water exercises due to preference. Pt was able to perform exercises independently with min to no cueing to perform exercises correctly. Educated pt on discharge plan, and pt verbalizes agreement and understanding. Provided aquatic HEP to perform.        Plan: Discharge home with HEP to continue independently.        Piotr Farris, PT

## 2024-05-23 ENCOUNTER — DOCUMENTATION (OUTPATIENT)
Dept: CASE MANAGEMENT | Facility: HOSPITAL | Age: 74
End: 2024-05-23
Payer: MEDICARE
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
## 2024-06-14 ENCOUNTER — APPOINTMENT (OUTPATIENT)
Dept: OTOLARYNGOLOGY | Facility: CLINIC | Age: 74
End: 2024-06-14
Payer: MEDICARE

## 2024-07-01 ENCOUNTER — CLINICAL SUPPORT (OUTPATIENT)
Dept: AUDIOLOGY | Facility: CLINIC | Age: 74
End: 2024-07-01
Payer: MEDICARE

## 2024-07-01 DIAGNOSIS — H93.13 TINNITUS OF BOTH EARS: ICD-10-CM

## 2024-07-01 DIAGNOSIS — H93.8X1 FULLNESS IN EAR, RIGHT: Primary | ICD-10-CM

## 2024-07-01 PROCEDURE — 92557 COMPREHENSIVE HEARING TEST: CPT

## 2024-07-01 PROCEDURE — 92567 TYMPANOMETRY: CPT

## 2024-07-01 ASSESSMENT — PAIN - FUNCTIONAL ASSESSMENT: PAIN_FUNCTIONAL_ASSESSMENT: 0-10

## 2024-07-01 ASSESSMENT — PAIN SCALES - GENERAL: PAINLEVEL_OUTOF10: 0 - NO PAIN

## 2024-07-01 NOTE — PROGRESS NOTES
AUDIOLOGIC EVALUATION      Name:  Vanesa Zelaya  :  1950  Age:  74 y.o.  Date of Evaluation:  2024    Time: 4422-4457    HISTORY:  Vanesa Zelaya, 74 y.o., was seen for an audiologic assessment. She reported long-standing right aural fullness and constant bilateral tinnitus. She noted her ears were cleaned in May 2024. She has a history of excessive recreational noise exposure (live music). She denied otalgia, otorrhea, dizziness, history of otologic surgeries, family history of hearing loss, and recent falls.       RESULTS:  Otoscopic Evaluation:  Right Ear: Unremarkable  Left Ear: Unremarkable    Immittance Measures:  Right Ear: Type A -- indicating normal volume, pressure, and static compliance  Left Ear: Type A -- indicating normal volume, pressure, and static compliance    Acoustic Reflexes:  Right Ear: Responses present at expected levels for 500-4000 Hz.  Left Ear: Responses present at expected levels for 500-4000 Hz.    Pure Tone Audiometry:    Right Ear: Hearing within normal limits 125-3000 Hz sloping to a moderately-severe sensorineural hearing loss at 8000 Hz    Left Ear: Hearing within normal limits 125-3000 Hz sloping to a moderately-severe sensorineural hearing loss at 8000 Hz    Asymmetry: No    In comparison to previous audio completed on 20, her hearing has declined bilaterally.      Reliability:   Good    Speech Audiometry:   Right: Speech Reception Threshold (SRT) was obtained at 20 dBHL.   SRT/PTA in Good agreement with pure tone average.    Left: Speech Reception Threshold (SRT) was obtained at 20 dBHL.   SRT/PTA in Good agreement with pure tone average.    Word Recognition Scores (WRS):  Right Ear: excellent (100%) in quiet when words were presented at 60 dBHL.   Left Ear: excellent (100%) in quiet when words were presented at 60 dBHL.     Asymmetry: No      IMPRESSIONS:  Today's testing revealed normal ear canal volume, middle ear pressure, and tympanic membrane compliance in  both ears. She has hearing within normal limits sloping to a moderately-severe sensorineural hearing loss in both ear with excellent word recognition scores. The results were discussed with the patient. She should follow-up for annual assessments. If she would like to discuss hearing aids she can schedule a hearing aid consultation with [facility] at [phone].       RECOMMENDATIONS:  1.) Continue medical follow up with Ears Nose and Throat (ENT) provider as recommended.  2.) Return for audiologic evaluation annually to monitor hearing sensitivity and assess middle ear status or sooner should concerns arise.  3.) Consider amplification. Check insurance for hearing aid benefits and in-network providers. To schedule a hearing aid consultation with [facility] audiology department, call [phone].       Wili Ovalles, CCC-A  Clinical Audiologist        KEY  SNHL Sensorineural Hearing Loss   CHL Conductive Hearing Loss   MHL Mixed Hearing Loss   SSNHL Sudden Sensorineural Hearing Loss   WNL Within Normal Limits   PTA Pure Tone Average   TM Tympanic Membrane   ECV Ear Canal Volume   SRT Speech Reception Threshold   WRS Word Recognition Score

## 2024-07-02 ENCOUNTER — OFFICE VISIT (OUTPATIENT)
Dept: PRIMARY CARE | Facility: CLINIC | Age: 74
End: 2024-07-02
Payer: MEDICARE

## 2024-07-02 VITALS
WEIGHT: 151.2 LBS | HEIGHT: 61 IN | RESPIRATION RATE: 16 BRPM | OXYGEN SATURATION: 99 % | BODY MASS INDEX: 28.55 KG/M2 | HEART RATE: 70 BPM | DIASTOLIC BLOOD PRESSURE: 77 MMHG | TEMPERATURE: 97.9 F | SYSTOLIC BLOOD PRESSURE: 132 MMHG

## 2024-07-02 DIAGNOSIS — I10 BENIGN ESSENTIAL HYPERTENSION: Primary | ICD-10-CM

## 2024-07-02 DIAGNOSIS — M25.50 ARTHRALGIA, UNSPECIFIED JOINT: ICD-10-CM

## 2024-07-02 PROCEDURE — 3075F SYST BP GE 130 - 139MM HG: CPT | Performed by: NURSE PRACTITIONER

## 2024-07-02 PROCEDURE — 1125F AMNT PAIN NOTED PAIN PRSNT: CPT | Performed by: NURSE PRACTITIONER

## 2024-07-02 PROCEDURE — 99214 OFFICE O/P EST MOD 30 MIN: CPT | Performed by: NURSE PRACTITIONER

## 2024-07-02 PROCEDURE — 3078F DIAST BP <80 MM HG: CPT | Performed by: NURSE PRACTITIONER

## 2024-07-02 RX ORDER — AMLODIPINE BESYLATE 5 MG/1
5 TABLET ORAL
Qty: 90 TABLET | Refills: 2 | Status: SHIPPED | OUTPATIENT
Start: 2024-07-02

## 2024-07-02 RX ORDER — AMLODIPINE BESYLATE 5 MG/1
1 TABLET ORAL
COMMUNITY
Start: 2024-06-24 | End: 2024-07-02 | Stop reason: SDUPTHER

## 2024-07-02 RX ORDER — ACETAMINOPHEN 500 MG
1000 TABLET ORAL EVERY 6 HOURS PRN
Qty: 90 TABLET | Refills: 2 | Status: SHIPPED | OUTPATIENT
Start: 2024-07-02

## 2024-07-02 SDOH — ECONOMIC STABILITY: FOOD INSECURITY: WITHIN THE PAST 12 MONTHS, THE FOOD YOU BOUGHT JUST DIDN'T LAST AND YOU DIDN'T HAVE MONEY TO GET MORE.: NEVER TRUE

## 2024-07-02 SDOH — ECONOMIC STABILITY: FOOD INSECURITY: WITHIN THE PAST 12 MONTHS, YOU WORRIED THAT YOUR FOOD WOULD RUN OUT BEFORE YOU GOT MONEY TO BUY MORE.: NEVER TRUE

## 2024-07-02 ASSESSMENT — ENCOUNTER SYMPTOMS
DEPRESSION: 0
LOSS OF SENSATION IN FEET: 0
OCCASIONAL FEELINGS OF UNSTEADINESS: 0

## 2024-07-02 ASSESSMENT — PAIN SCALES - GENERAL: PAINLEVEL: 7

## 2024-07-02 ASSESSMENT — LIFESTYLE VARIABLES: HOW MANY STANDARD DRINKS CONTAINING ALCOHOL DO YOU HAVE ON A TYPICAL DAY: PATIENT DOES NOT DRINK

## 2024-07-02 NOTE — PATIENT INSTRUCTIONS
Thank you for coming in for your visit today!    Please follow up in 4-5 months for blood pressure follow up or sooner if needed.    For your blood pressure:  Continue amlodipine  Take your medication as directed. Try to take it around the same time daily.   Keep a log of your blood pressure. Be sure to bring it with you to your next appointment so we can review it together.  Adhere to the DASH diet. This includes decreasing your salt/sodium intake. Avoid canned foods, lunch meats, and frozen foods.  Exercise for 30 minutes daily.          Call 911 or go to the emergency room if you have pain in your chest, difficulty breathing, or other life threatening symptoms.
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
## 2024-07-11 ENCOUNTER — APPOINTMENT (OUTPATIENT)
Dept: PRIMARY CARE | Facility: CLINIC | Age: 74
End: 2024-07-11
Payer: MEDICARE

## 2024-08-05 ENCOUNTER — APPOINTMENT (OUTPATIENT)
Dept: OTOLARYNGOLOGY | Facility: CLINIC | Age: 74
End: 2024-08-05
Payer: MEDICARE

## 2024-08-05 DIAGNOSIS — Z86.69 HISTORY OF EUSTACHIAN TUBE DYSFUNCTION: ICD-10-CM

## 2024-08-05 DIAGNOSIS — H90.3 SENSORINEURAL HEARING LOSS (SNHL) OF BOTH EARS: Primary | ICD-10-CM

## 2024-08-05 PROCEDURE — 1160F RVW MEDS BY RX/DR IN RCRD: CPT

## 2024-08-05 PROCEDURE — 1036F TOBACCO NON-USER: CPT

## 2024-08-05 PROCEDURE — 99212 OFFICE O/P EST SF 10 MIN: CPT

## 2024-08-05 PROCEDURE — 1159F MED LIST DOCD IN RCRD: CPT

## 2024-08-05 NOTE — PROGRESS NOTES
Patient ID: Vanesa Zelaya is a 74 y.o. female who presents for a virtual video follow-up visit.     PROVIDER IMPRESSIONS:  DIAGNOSES/PROBLEMS:  -Bilateral sensorineural hearing loss  -History of eustachian tube dysfunction    ASSESSMENT:   Vanesa Zelaya is a pleasant 74 y.o. female who presents for a virtual follow-up visit to review recent audiogram results and for subsequent evaluation of right hearing difficulty, right ear pain, plugged sensation in right ear, bilateral ear itching and bilateral non-pulsatile tinnitus. Overall, patient reports that right ear pain, hearing difficulty, and plugged sensation have improved significantly with recommended treatment. Based on the clinical information provided, symptoms and clinical exam findings are consistent with bilateral sensorineural hearing loss and resolving ETD. Audiogram reviewed in detail with the patient, which showed normal sloping to moderate SNHL in bilateral ears above 3000 Hz and normal tympanogram bilaterally. I explained to patient that initiation of Flonase may have resolved ETD by the time she received an audiogram evaluation, given that right ear symptoms had improved by that time as well.     PLAN:  Patient advised to wear ear hearing protection while in the presence of loud sounds. The patient was also counseled to utilize use tinnitus coping strategies as needed, such as sound apps on a smartphone, utilizing calming noise in the room, running a fan at night, etc.    I recommended continuing nasal steroid spray  fluticasone (Flonase) to maintain ET orifice patency and prevent ETD. I recommended either 2 puffs into each nostril daily, or 1 puff into each nostril twice daily for a consistent trial of at least 4-6 weeks. Patient counseled that this medication is a topical intranasal steroid nasal spray indicated to reduce nasal inflammation. Patient was educated on proper administration of nasal spray, by tilting their head down and pointing the  applicator tip towards the lateral wall of the nose/corner of the eye on the same side. I advised patient to avoid pointing applicator toward the septum due to the risk of nasal bleeding.  Patient informed to discontinue the spray if they experience adverse side effects. This medication may be purchased over the counter; a prescription may be sent to the patient's pharmacy upon request.  Follow-up: Patient may schedule for follow-up in 9 months for routine removal of impacted cerumen. Patient is agreeable to this plan, all questions were answered to patient's satisfaction.       An interactive audio and video telecommunication system which permits real time communications between the patient (at the originating site) and provider (at the distant site) was utilized to provide this telehealth service.   Verbal consent was requested and obtained from Vanesa Zelaya on this date, 08/05/24 for a telehealth visit.   At today's virtual visit with Vanesa Zelaya , the number of minutes I spent providing patient care was 11.   More than 50% of that time was spent counseling the patient on possible etiologies, test results, treatment options and care coordination.    Subjective   HPI: Vanesa Zelaya is a 74 y.o. female who presents virtually for the follow-up evaluation for right hearing difficulty, right ear pain, plugged sensation in right ear, bilateral ear itching and bilateral non-pulsatile tinnitus and to discuss recent audiogram results. Since last visit on 5/22/24, the patient reports that symptoms of pain, hearing difficulty, and plugged sensation in the right ear have significantly improved. She reports ongoing symptoms of bilateral ear itching and bilateral non-pulsatile tinnitus which have remained unchanged. She states that tinnitus is non-bothersome and she often does not notice tinnitus. Reports that she has been using Flonase daily with noticeable symptom benefit. Mentions that she noticed that her right ear has  "been popping less since initiating consistent use of Flonase. The patient denies new symptoms of hearing loss, ear drainage, autophony, dizziness and/or vertigo.    RECALL 5/22/24:  HPI: Vanesa Zelaya is a 74 y.o. female referred by PCP  for clinical evaluation of plugged right ear sensation, right ear pain, and right ear itching and with the request for earwax removal. States that symptoms began approximately 1 year ago. Reports that she feels that hearing sounds muffled in the right ear and accompanied by intermittent pain/pressure sensation. Mentions that she notices an occasional \"swishing\" sound in the right ear. When asked about the presence of associated otologic symptoms, including tinnitus, ear drainage, autophony, dizziness or vertigo, the patient admits to longstanding history of bilateral non-pulsatile tinnitus for approximately 50 years that is typically non-bothersome. When asked about pertinent otologic history, the patient denies a history of recurrent ear infections, denies history of ear surgery, denies history of PE tube insertion, and denies history of prolonged/traumatic loud noise exposure. The patient does not have family history of hearing loss. The patient does insert Q-tips in the ear canals. Reports that she also has attempted to perform ear candling, external ear massages, and has stuck her head under the sink to rinse ear canals with warm water which has not provided symptom benefit. The patient denies prior history of cerumen impaction. They have not been using ear drops to loosen wax immediately prior to this visit. Reports a history of TMJ dysfunction. Reports that she prefers not to take a lot of medications for her care. Mentions that she previously received a hearing test approximately 4 years ago at [facility] that showed hearing loss.   ASSESSMENT: Vanesa Zelaya is a 74 y.o. female who presents for an initial encounter with symptoms of right hearing difficulty, right ear " pain, plugged sensation in right ear, bilateral ear itching and bilateral non-pulsatile tinnitus. The clinical findings that are consistent with right cerumen impaction and ETD vs. TMJ dysfunction. Using appropriate instrumentation, impacted cerumen was successfully removed from the right EAC. Exam today revealed mild/moderate TM retraction bilaterally, bilateral TMJ crepitus (left>right) and bilateral inferior nasal turbinate hypertrophy. Reassurance provided to patient that otologic exam today revealed no evidence of acute infection/inflammation in the EAC bilaterally and that TM appears with no evidence of infection, effusion, or perforation bilaterally. I recommended audiogram evaluation to further evaluate hearing and middle ear function and assess for negative middle ear pressures. I explained to patient that initial management approach will include conservative management for ETD and ITH until tympanometry testing is performed to confirm ETD vs. TMJ dysfunction for patient symptoms. The patient was counseled on the possible etiologies of obstructive ETD. Typical symptoms were reviewed, including otalgia, aural fullness, muffled or decreased hearing, tinnitus, plugged ear sensation, and vertigo or disequilibrium in severe cases. The patient was educated that ruth-challenge, such as air travel or land elevation, may provoke symptom presentation or exacerbation.  PLAN:   I recommended referral to audiology for audiogram evaluation in the next 1-4 weeks to evaluate hearing function and determine presence of ETD. Referral e-submitted and patient advised to contact central scheduling for audiogram.  I recommended starting nasal steroid spray  fluticasone (Flonase) for suspected ETD and for bilateral ITH. I recommended either 2 puffs into each nostril daily, or 1 puff into each nostril twice daily for a consistent trial of at least 4-6 weeks. Patient counseled that this medication is a topical intranasal steroid  nasal spray indicated to reduce nasal inflammation. Patient was educated on proper administration of nasal spray, by tilting their head down and pointing the applicator tip towards the lateral wall of the nose/corner of the eye on the same side. I advised patient to avoid pointing applicator toward the septum due to the risk of nasal bleeding.  Patient informed to discontinue the spray if they experience adverse side effects. This prescription was be sent to the patient's pharmacy upon request (x 11 refills)  Patient instructed to practice autoinsufflation (pinch your nose and pop your ears) several times per day to exercise the Eustachian Tube.   I counseled patient on safe interventions for cerumen management at home. Patient may wash the external ear with a cloth. I reinforced education to the patient that they should avoid using cotton tipped applicators, tissues, paper, or any rigid object in the ear canal. I explained to the patient that doing so may cause wax to be pushed back into the ear canal and stuck and also risks injury to the ear canal and ear drum.   Follow-up: Patient may schedule for follow-up virtually after obtaining audiogram to review the results and determine further recommendations for ETD vs. TMD. They may follow up with me as requested, sooner if needed. All questions answered to patient satisfaction.    PATIENT HISTORY:  Past Medical History:   Diagnosis Date    ADHD     Anxiety     Bell's palsy     Bipolar 1 disorder (Multi)     Bipolar disorder, currently in remission, most recent episode unspecified (Multi) 10/17/2019    History of depressed bipolar disorder    Cervical cancer (Multi)     Diverticulitis     Ear infection     Hemorrhoid     History of bleeding ulcers     Hx of tinnitus     Obsessive compulsive disorder     Rectal bleeding     Scoliosis     Stomach cancer (Multi)     Urinary tract infection       Past Surgical History:   Procedure Laterality Date    MR HEAD ANGIO WO IV  CONTRAST  11/11/2019    MR HEAD ANGIO WO IV CONTRAST 11/11/2019 [facility]    MR NECK ANGIO WO IV CONTRAST  11/11/2019    MR NECK ANGIO WO IV CONTRAST 11/11/2019 [facility]    OTHER SURGICAL HISTORY      Hernia repair WITH MESH    OTHER SURGICAL HISTORY      CERVICAL CANCER    OTHER SURGICAL HISTORY      SPINAL SURGERY      Allergies   Allergen Reactions    Aspirin GI bleeding and Other    Ibuprofen GI bleeding     rectal bleeding    Lactose Other    Naproxen Sodium GI bleeding     rectal bleeding    Other GI Upset     Apap-Fd&C Blue #1-Na Benzoate    Tylenol [Acetaminophen] Other     STOMACH UPSET        Current Outpatient Medications:     acetaminophen (Tylenol Extra Strength) 500 mg tablet, Take 2 tablets (1,000 mg) by mouth every 6 hours if needed for mild pain (1 - 3)., Disp: 90 tablet, Rfl: 2    acetaminophen (Tylenol) 500 mg tablet, Take by mouth every 6 hours if needed for mild pain (1 - 3)., Disp: , Rfl:     amLODIPine (Norvasc) 5 mg tablet, Take 1 tablet (5 mg) by mouth early in the morning.., Disp: 90 tablet, Rfl: 2    fluticasone (Flonase) 50 mcg/actuation nasal spray, Administer 2 sprays into each nostril once daily. Shake gently. Before first use, prime pump. After use, clean tip and replace cap., Disp: 16 g, Rfl: 11    TURMERIC ORAL, Take by mouth., Disp: , Rfl:    Tobacco Use: Medium Risk (7/9/2024)    Received [facility]    Patient History     Smoking Tobacco Use: Former     Smokeless Tobacco Use: Former     Passive Exposure: Not on file      Alcohol Use: Unknown (7/2/2024)    AUDIT-C     Frequency of Alcohol Consumption: Not on file     Average Number of Drinks: Patient does not drink     Frequency of Binge Drinking: Not on file      Social History     Substance and Sexual Activity   Drug Use Never        Review of Systems   All other systems negative.     Objective   TELEHEALTH PHYSICAL EXAM:  CONSTITUTIONAL: No acute distress, normal facial features; No fever; no  chills  VOICE: No hoarseness or other audible abnormality  RESPIRATION: Breathing comfortably, no stridor; normal breathing effort  CV: No cyanosis visible on the face and neck area  EYES: Pupils equal and round; no erythema; conjunctiva clear; sclera white  NEURO: Alert and oriented, able to raise eyebrows symmetrical bilateral, smile with no facial droop, able to swallow  HEAD AND FACE: Symmetric facial features, no masses or lesions Right ear examination: External ear normal. Left ear examination: External ear normal.  NOSE: External nose midline  ORAL CAVITY: No lesions of external lips; uvula is midline; tongue with good mobility; no gross mass in oral cavity; mucosa appears pink  NECK/LYMPH: No obvious deformity or lesions; trachea is midline   PSYCH: Alert and oriented with appropriate mood and affect    RESULTS:  I personally reviewed the patient's audiogram today from 7/3/24 which showed: Normal hearing through 3000 Hz, sloping to mild to moderate SNHL above in bilateral ears. Normal tympanogram bilaterally. Excellent word discrimination bilaterally at 60 dB. Acoustic reflexes not tested bilaterally.    Candace Crowell, APRN-CNP

## 2024-12-10 ENCOUNTER — OFFICE VISIT (OUTPATIENT)
Dept: PRIMARY CARE | Facility: CLINIC | Age: 74
End: 2024-12-10
Payer: MEDICARE

## 2024-12-10 VITALS
DIASTOLIC BLOOD PRESSURE: 80 MMHG | WEIGHT: 156 LBS | BODY MASS INDEX: 29.45 KG/M2 | OXYGEN SATURATION: 97 % | HEIGHT: 61 IN | TEMPERATURE: 95.6 F | HEART RATE: 84 BPM | RESPIRATION RATE: 16 BRPM | SYSTOLIC BLOOD PRESSURE: 129 MMHG

## 2024-12-10 DIAGNOSIS — M25.50 ARTHRALGIA, UNSPECIFIED JOINT: ICD-10-CM

## 2024-12-10 DIAGNOSIS — E55.9 VITAMIN D DEFICIENCY: ICD-10-CM

## 2024-12-10 DIAGNOSIS — Z00.00 ROUTINE GENERAL MEDICAL EXAMINATION AT HEALTH CARE FACILITY: Primary | ICD-10-CM

## 2024-12-10 DIAGNOSIS — R73.9 HYPERGLYCEMIA: ICD-10-CM

## 2024-12-10 DIAGNOSIS — I10 BENIGN ESSENTIAL HYPERTENSION: ICD-10-CM

## 2024-12-10 LAB
25(OH)D3 SERPL-MCNC: 26 NG/ML (ref 30–100)
ANION GAP SERPL CALC-SCNC: 12 MMOL/L (ref 10–20)
BUN SERPL-MCNC: 10 MG/DL (ref 6–23)
CALCIUM SERPL-MCNC: 9.7 MG/DL (ref 8.6–10.6)
CHLORIDE SERPL-SCNC: 103 MMOL/L (ref 98–107)
CHOLEST SERPL-MCNC: 232 MG/DL (ref 0–199)
CHOLESTEROL/HDL RATIO: 3.7
CO2 SERPL-SCNC: 27 MMOL/L (ref 21–32)
CREAT SERPL-MCNC: 0.52 MG/DL (ref 0.5–1.05)
EGFRCR SERPLBLD CKD-EPI 2021: >90 ML/MIN/1.73M*2
EST. AVERAGE GLUCOSE BLD GHB EST-MCNC: 103 MG/DL
GLUCOSE SERPL-MCNC: 104 MG/DL (ref 74–99)
HBA1C MFR BLD: 5.2 %
HDLC SERPL-MCNC: 63.5 MG/DL
LDLC SERPL CALC-MCNC: 147 MG/DL
NON HDL CHOLESTEROL: 169 MG/DL (ref 0–149)
POTASSIUM SERPL-SCNC: 4.1 MMOL/L (ref 3.5–5.3)
SODIUM SERPL-SCNC: 138 MMOL/L (ref 136–145)
TRIGL SERPL-MCNC: 108 MG/DL (ref 0–149)
TSH SERPL-ACNC: 1.08 MIU/L (ref 0.44–3.98)
VLDL: 22 MG/DL (ref 0–40)

## 2024-12-10 PROCEDURE — G0439 PPPS, SUBSEQ VISIT: HCPCS | Performed by: NURSE PRACTITIONER

## 2024-12-10 PROCEDURE — 99215 OFFICE O/P EST HI 40 MIN: CPT | Performed by: NURSE PRACTITIONER

## 2024-12-10 PROCEDURE — 1170F FXNL STATUS ASSESSED: CPT | Performed by: NURSE PRACTITIONER

## 2024-12-10 PROCEDURE — 80061 LIPID PANEL: CPT | Performed by: NURSE PRACTITIONER

## 2024-12-10 PROCEDURE — 3074F SYST BP LT 130 MM HG: CPT | Performed by: NURSE PRACTITIONER

## 2024-12-10 PROCEDURE — 1036F TOBACCO NON-USER: CPT | Performed by: NURSE PRACTITIONER

## 2024-12-10 PROCEDURE — 82306 VITAMIN D 25 HYDROXY: CPT | Performed by: NURSE PRACTITIONER

## 2024-12-10 PROCEDURE — 1160F RVW MEDS BY RX/DR IN RCRD: CPT | Performed by: NURSE PRACTITIONER

## 2024-12-10 PROCEDURE — 84443 ASSAY THYROID STIM HORMONE: CPT | Performed by: NURSE PRACTITIONER

## 2024-12-10 PROCEDURE — 3079F DIAST BP 80-89 MM HG: CPT | Performed by: NURSE PRACTITIONER

## 2024-12-10 PROCEDURE — 80048 BASIC METABOLIC PNL TOTAL CA: CPT | Performed by: NURSE PRACTITIONER

## 2024-12-10 PROCEDURE — 1125F AMNT PAIN NOTED PAIN PRSNT: CPT | Performed by: NURSE PRACTITIONER

## 2024-12-10 PROCEDURE — 36415 COLL VENOUS BLD VENIPUNCTURE: CPT | Performed by: NURSE PRACTITIONER

## 2024-12-10 PROCEDURE — 1159F MED LIST DOCD IN RCRD: CPT | Performed by: NURSE PRACTITIONER

## 2024-12-10 PROCEDURE — 83036 HEMOGLOBIN GLYCOSYLATED A1C: CPT | Performed by: NURSE PRACTITIONER

## 2024-12-10 ASSESSMENT — PATIENT HEALTH QUESTIONNAIRE - PHQ9
SUM OF ALL RESPONSES TO PHQ9 QUESTIONS 1 AND 2: 0
1. LITTLE INTEREST OR PLEASURE IN DOING THINGS: NOT AT ALL
2. FEELING DOWN, DEPRESSED OR HOPELESS: NOT AT ALL

## 2024-12-10 ASSESSMENT — ENCOUNTER SYMPTOMS
DEPRESSION: 0
OCCASIONAL FEELINGS OF UNSTEADINESS: 1
LOSS OF SENSATION IN FEET: 0

## 2024-12-10 ASSESSMENT — ACTIVITIES OF DAILY LIVING (ADL)
MANAGING_FINANCES: INDEPENDENT
DRESSING: INDEPENDENT
TAKING_MEDICATION: INDEPENDENT
BATHING: INDEPENDENT
DOING_HOUSEWORK: NEEDS ASSISTANCE
GROCERY_SHOPPING: INDEPENDENT

## 2024-12-10 ASSESSMENT — PAIN SCALES - GENERAL: PAINLEVEL_OUTOF10: 9

## 2024-12-10 NOTE — PATIENT INSTRUCTIONS
Thank you for coming in for your visit today!    Please follow up in 5-6 months or sooner if needed.    For your blood pressure:  Keep a log of your blood pressure. Be sure to bring it with you to your next appointment so we can review it together.  Adhere to the DASH diet. This includes decreasing your salt/sodium intake. Avoid canned foods, lunch meats, and frozen foods.  Exercise for 30 minutes daily.    Today we completed blood work. We will contact you with any abnormalities from this testing.    New CDC recommendations pause Tdap vaccination after age 65. We will defer today.     You are also due for RSV and flu vaccines.       Call 911 or go to the emergency room if you have pain in your chest, difficulty breathing, or other life threatening symptoms.

## 2024-12-10 NOTE — PROGRESS NOTES
"Subjective   Vanesa Zelaya is a 74 y.o. female who presents for Medicare Annual Wellness Visit Subsequent.  HPI  Ms. Zelaya is a 75 yo F here today for follow up and medicare wellness.   Blood pressure with improvement today. Denies headache, chest pain, palpitations, blurred vision, or dizziness. This si without medication    Connected with ENT. Recommended flonase for eustachian tube dysfunction.   Patient notes that she has been stretching, lifting. Using walker with good stable, support. Feeling well.   Traveled to [facility] this year  Went [employer] in August  Has been visiting family.   Plans to go to [place] in May     UTD with eye exam.   Just received new glasses  +glaucoma/ cataracts. She has transitional lenses.   Has challenge driving in the dark.     Tomato, arugula, egg typically for breakfast. Tries to eat a well balanced and healthy diet.     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 She works as a  and loves her job. Feels good to interact with others.     Was going to water therapy but the copay cost was very high.  Swimming and diving class   Will use her silver sneakers     Has an area of concern on the back of her head for hair loss but does note that she typically sleeps on her back in the same position.      Due for several immunizations including flu and pneumonia? But believes she had the pneumonia vaccine several years ago. She declines the flu vaccine today.    All systems reviewed. Review of systems negative except for noted positives in HPI    Objective     /80   Pulse 84   Temp 35.3 °C (95.6 °F)   Resp 16   Ht 1.549 m (5' 1\")   Wt 70.8 kg (156 lb)   SpO2 97%   BMI 29.48 kg/m²    Vital signs noted and reviewed.       Physical Exam  Constitutional:       Appearance: Normal appearance.   Cardiovascular:      Rate and Rhythm: Normal rate and regular rhythm.   Pulmonary:      Effort: Pulmonary effort is normal. No respiratory distress.      " Breath sounds: Normal breath sounds.   Skin:     General: Skin is warm and dry.   Neurological:      Mental Status: She is oriented to person, place, and time.   Psychiatric:         Mood and Affect: Mood normal.             Assessment/Plan   Problem List Items Addressed This Visit       Benign essential hypertension    Relevant Orders    Lipid Panel    Basic Metabolic Panel    Hemoglobin A1C    TSH with reflex to Free T4 if abnormal     Other Visit Diagnoses       Routine general medical examination at health care facility    -  Primary    Arthralgia, unspecified joint        Vitamin D deficiency        Relevant Orders    Vitamin D 25-Hydroxy,Total (for eval of Vitamin D levels)    Hyperglycemia        Relevant Orders    Hemoglobin A1C

## 2024-12-12 ENCOUNTER — TELEPHONE (OUTPATIENT)
Dept: PRIMARY CARE | Facility: CLINIC | Age: 74
End: 2024-12-12
Payer: MEDICARE

## 2024-12-12 DIAGNOSIS — M51.360 DEGENERATION OF INTERVERTEBRAL DISC OF LUMBAR REGION WITH DISCOGENIC BACK PAIN: Primary | ICD-10-CM

## 2024-12-12 DIAGNOSIS — F43.21 GRIEF REACTION: Primary | ICD-10-CM

## 2024-12-12 RX ORDER — IBUPROFEN 800 MG/1
800 TABLET ORAL DAILY PRN
Qty: 15 TABLET | Refills: 0 | Status: SHIPPED | OUTPATIENT
Start: 2024-12-12 | End: 2025-02-10

## 2024-12-12 NOTE — TELEPHONE ENCOUNTER
Patient called stating her medication was not sent to her pharmacy and she wants to speak with you regarding her son passing away yesterday.

## 2025-01-13 ENCOUNTER — APPOINTMENT (OUTPATIENT)
Dept: BEHAVIORAL HEALTH | Facility: CLINIC | Age: 75
End: 2025-01-13
Payer: MEDICARE

## 2025-01-13 VITALS
TEMPERATURE: 98 F | RESPIRATION RATE: 18 BRPM | HEART RATE: 82 BPM | DIASTOLIC BLOOD PRESSURE: 90 MMHG | SYSTOLIC BLOOD PRESSURE: 154 MMHG

## 2025-01-13 DIAGNOSIS — F43.21 GRIEF REACTION: ICD-10-CM

## 2025-01-13 DIAGNOSIS — F43.10 PTSD (POST-TRAUMATIC STRESS DISORDER): ICD-10-CM

## 2025-01-13 PROBLEM — F43.20 GRIEF REACTION: Status: ACTIVE | Noted: 2025-01-13

## 2025-01-13 PROCEDURE — 3077F SYST BP >= 140 MM HG: CPT | Performed by: PSYCHIATRY & NEUROLOGY

## 2025-01-13 PROCEDURE — 90792 PSYCH DIAG EVAL W/MED SRVCS: CPT | Performed by: PSYCHIATRY & NEUROLOGY

## 2025-01-13 PROCEDURE — 3080F DIAST BP >= 90 MM HG: CPT | Performed by: PSYCHIATRY & NEUROLOGY

## 2025-01-13 PROCEDURE — 1126F AMNT PAIN NOTED NONE PRSNT: CPT | Performed by: PSYCHIATRY & NEUROLOGY

## 2025-01-13 PROCEDURE — 1159F MED LIST DOCD IN RCRD: CPT | Performed by: PSYCHIATRY & NEUROLOGY

## 2025-01-13 PROCEDURE — 1160F RVW MEDS BY RX/DR IN RCRD: CPT | Performed by: PSYCHIATRY & NEUROLOGY

## 2025-01-13 ASSESSMENT — ENCOUNTER SYMPTOMS
HYPERACTIVE: 0
CONFUSION: 0
HALLUCINATIONS: 0
NAUSEA: 0
SEIZURES: 0
AGITATION: 0
FATIGUE: 1
NERVOUS/ANXIOUS: 1
DECREASED CONCENTRATION: 1
SHORTNESS OF BREATH: 0
SLEEP DISTURBANCE: 1
PALPITATIONS: 0
DIARRHEA: 0
DIFFICULTY URINATING: 0
VOMITING: 0
TREMORS: 0
BACK PAIN: 1
APPETITE CHANGE: 1
DYSPHORIC MOOD: 0

## 2025-01-13 ASSESSMENT — PAIN SCALES - GENERAL: PAINLEVEL_OUTOF10: 0-NO PAIN
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
## 2025-02-24 DIAGNOSIS — M51.360 DEGENERATION OF INTERVERTEBRAL DISC OF LUMBAR REGION WITH DISCOGENIC BACK PAIN: ICD-10-CM

## 2025-02-25 RX ORDER — IBUPROFEN 800 MG/1
800 TABLET ORAL DAILY PRN
Qty: 15 TABLET | Refills: 0 | Status: SHIPPED | OUTPATIENT
Start: 2025-02-25 | End: 2025-04-26

## 2025-03-03 ENCOUNTER — TELEMEDICINE (OUTPATIENT)
Dept: PRIMARY CARE | Facility: CLINIC | Age: 75
End: 2025-03-03
Payer: MEDICARE

## 2025-03-03 DIAGNOSIS — M16.0 PRIMARY OSTEOARTHRITIS OF BOTH HIPS: Primary | ICD-10-CM

## 2025-03-03 PROCEDURE — G2211 COMPLEX E/M VISIT ADD ON: HCPCS | Performed by: NURSE PRACTITIONER

## 2025-03-03 PROCEDURE — 99213 OFFICE O/P EST LOW 20 MIN: CPT | Performed by: NURSE PRACTITIONER

## 2025-03-03 NOTE — PATIENT INSTRUCTIONS
Thank you for coming in for your visit today!    Please follow up in     Call to schedule with orthopedics     As discussed, get connected with a psychologist for talk therapy.    Call 911 or go to the emergency room if you have pain in your chest, difficulty breathing, or other life threatening symptoms.

## 2025-03-03 NOTE — PROGRESS NOTES
Subjective   Vanesa Zelaya is a 74 y.o. female who presents for No chief complaint on file..  HPI  Popping + pain to her bilateral hips   Tumeric +ibuprofen.   She notes that she is in a lot of pain  Does have last xray in 2023 showcasing moderate osteoarthritis to bilateral hips as well as narrowing of lumbar spine.     All systems reviewed. Review of systems negative except for noted positives in HPI    Objective     There were no vitals taken for this visit.   Vital signs noted and reviewed.       Physical Exam        Assessment/Plan   Problem List Items Addressed This Visit    None

## 2025-03-04 ENCOUNTER — HOSPITAL ENCOUNTER (OUTPATIENT)
Dept: RADIOLOGY | Facility: CLINIC | Age: 75
Discharge: HOME | End: 2025-03-04
Payer: MEDICARE

## 2025-03-04 DIAGNOSIS — M16.0 PRIMARY OSTEOARTHRITIS OF BOTH HIPS: ICD-10-CM

## 2025-03-04 PROCEDURE — 73523 X-RAY EXAM HIPS BI 5/> VIEWS: CPT

## 2025-03-20 ENCOUNTER — OFFICE VISIT (OUTPATIENT)
Dept: ORTHOPEDIC SURGERY | Facility: CLINIC | Age: 75
End: 2025-03-20
Payer: MEDICARE

## 2025-03-20 VITALS — BODY MASS INDEX: 25.1 KG/M2 | HEIGHT: 64 IN | WEIGHT: 147 LBS

## 2025-03-20 DIAGNOSIS — M16.0 PRIMARY OSTEOARTHRITIS OF BOTH HIPS: ICD-10-CM

## 2025-03-20 DIAGNOSIS — M25.552 BILATERAL HIP PAIN: Primary | ICD-10-CM

## 2025-03-20 DIAGNOSIS — M25.551 BILATERAL HIP PAIN: Primary | ICD-10-CM

## 2025-03-20 PROCEDURE — 1160F RVW MEDS BY RX/DR IN RCRD: CPT | Performed by: PHYSICIAN ASSISTANT

## 2025-03-20 PROCEDURE — 1159F MED LIST DOCD IN RCRD: CPT | Performed by: PHYSICIAN ASSISTANT

## 2025-03-20 PROCEDURE — 99203 OFFICE O/P NEW LOW 30 MIN: CPT | Performed by: PHYSICIAN ASSISTANT

## 2025-03-20 PROCEDURE — 99213 OFFICE O/P EST LOW 20 MIN: CPT | Performed by: PHYSICIAN ASSISTANT

## 2025-03-20 PROCEDURE — 1125F AMNT PAIN NOTED PAIN PRSNT: CPT | Performed by: PHYSICIAN ASSISTANT

## 2025-03-20 PROCEDURE — 1036F TOBACCO NON-USER: CPT | Performed by: PHYSICIAN ASSISTANT

## 2025-03-20 ASSESSMENT — LIFESTYLE VARIABLES
SKIP TO QUESTIONS 9-10: 1
HOW OFTEN DURING THE LAST YEAR HAVE YOU BEEN UNABLE TO REMEMBER WHAT HAPPENED THE NIGHT BEFORE BECAUSE YOU HAD BEEN DRINKING: NEVER
HOW OFTEN DURING THE LAST YEAR HAVE YOU NEEDED AN ALCOHOLIC DRINK FIRST THING IN THE MORNING TO GET YOURSELF GOING AFTER A NIGHT OF HEAVY DRINKING: NEVER
HOW MANY STANDARD DRINKS CONTAINING ALCOHOL DO YOU HAVE ON A TYPICAL DAY: 1 OR 2
HOW OFTEN DURING THE LAST YEAR HAVE YOU FAILED TO DO WHAT WAS NORMALLY EXPECTED FROM YOU BECAUSE OF DRINKING: NEVER
HOW OFTEN DURING THE LAST YEAR HAVE YOU HAD A FEELING OF GUILT OR REMORSE AFTER DRINKING: NEVER
HOW OFTEN DO YOU HAVE A DRINK CONTAINING ALCOHOL: MONTHLY OR LESS
HOW OFTEN DURING THE LAST YEAR HAVE YOU FOUND THAT YOU WERE NOT ABLE TO STOP DRINKING ONCE YOU HAD STARTED: NEVER
HAS A RELATIVE, FRIEND, DOCTOR, OR ANOTHER HEALTH PROFESSIONAL EXPRESSED CONCERN ABOUT YOUR DRINKING OR SUGGESTED YOU CUT DOWN: NO
AUDIT-C TOTAL SCORE: 1
AUDIT TOTAL SCORE: 1
HAVE YOU OR SOMEONE ELSE BEEN INJURED AS A RESULT OF YOUR DRINKING: NO
HOW OFTEN DO YOU HAVE SIX OR MORE DRINKS ON ONE OCCASION: NEVER

## 2025-03-20 ASSESSMENT — PATIENT HEALTH QUESTIONNAIRE - PHQ9
1. LITTLE INTEREST OR PLEASURE IN DOING THINGS: NOT AT ALL
SUM OF ALL RESPONSES TO PHQ9 QUESTIONS 1 AND 2: 0
2. FEELING DOWN, DEPRESSED OR HOPELESS: NOT AT ALL

## 2025-03-20 ASSESSMENT — COLUMBIA-SUICIDE SEVERITY RATING SCALE - C-SSRS
6. HAVE YOU EVER DONE ANYTHING, STARTED TO DO ANYTHING, OR PREPARED TO DO ANYTHING TO END YOUR LIFE?: NO
1. IN THE PAST MONTH, HAVE YOU WISHED YOU WERE DEAD OR WISHED YOU COULD GO TO SLEEP AND NOT WAKE UP?: NO
2. HAVE YOU ACTUALLY HAD ANY THOUGHTS OF KILLING YOURSELF?: NO

## 2025-03-20 ASSESSMENT — PAIN DESCRIPTION - DESCRIPTORS: DESCRIPTORS: ACHING

## 2025-03-20 ASSESSMENT — PAIN SCALES - GENERAL
PAINLEVEL_OUTOF10: 10-WORST PAIN EVER
PAINLEVEL_OUTOF10: 10 - WORST POSSIBLE PAIN

## 2025-03-20 ASSESSMENT — PAIN - FUNCTIONAL ASSESSMENT: PAIN_FUNCTIONAL_ASSESSMENT: 0-10

## 2025-03-20 ASSESSMENT — ENCOUNTER SYMPTOMS
OCCASIONAL FEELINGS OF UNSTEADINESS: 1
DEPRESSION: 0
LOSS OF SENSATION IN FEET: 0

## 2025-03-20 NOTE — PROGRESS NOTES
Subjective      Chief Complaint   Patient presents with    Right Hip - Pain    Left Hip - Pain        Past Surgical History:   Procedure Laterality Date    MR HEAD ANGIO WO IV CONTRAST  11/11/2019    MR HEAD ANGIO WO IV CONTRAST 11/11/2019 [facility]    MR NECK ANGIO WO IV CONTRAST  11/11/2019    MR NECK ANGIO WO IV CONTRAST 11/11/2019 [facility]    OTHER SURGICAL HISTORY      Hernia repair WITH MESH    OTHER SURGICAL HISTORY      CERVICAL CANCER    OTHER SURGICAL HISTORY      SPINAL SURGERY        Past Medical History:   Diagnosis Date    ADHD     Anxiety     Bell's palsy     Bipolar 1 disorder (Multi)     Bipolar disorder, currently in remission, most recent episode unspecified (Multi) 10/17/2019    History of depressed bipolar disorder    Cervical cancer     Diverticulitis     Ear infection     Hemorrhoid     History of bleeding ulcers     Hx of tinnitus     Obsessive compulsive disorder     Rectal bleeding     Scoliosis     Stomach cancer (Multi)     Urinary tract infection         HPI  This 74 year old patient presents today with bilateral hip pain (10/10). The patient states that this bilateral hip pain has been worsening and persistent for several years and worsening over the past several months. The patient denies trauma or injury to either hip. The patient states that their bilateral hip pain is worse with and aggravated by prolonged walking and standing. The patient states that this bilateral  hip pain impairs their ability to complete normal activities of daily living. The patient is unable to take NSAIDS or Tylenol due to GI issues. She uses a Rolator walker for assist due to bilateral hip pain.     Allergies   Allergen Reactions    Aspirin GI bleeding and Other    Ibuprofen GI bleeding     rectal bleeding    Lactose Other    Naproxen Sodium GI bleeding     rectal bleeding    Other GI Upset     Apap-Fd&C Blue #1-Na Benzoate    Tylenol [Acetaminophen] Other     STOMACH UPSET      Body  mass index is 25.23 kg/m².     ROS  CARDIOLOGY:   Negative for chest pain, shortness of breath.   RESPIRATORY:   Negative for chest pain, shortness of breath.   MUSCULOSKELETAL:   See HPI for details.   NEUROLOGY:   Negative for tingling, numbness, weakness.    Objective      Body mass index is 25.23 kg/m².     Physical Exam  GENERAL:          General Appearance:  This is a pleasant patient with appropriate affect, in no acute distress.   DERMATOLOGY:          Skin: skin at the neck, upper and lower back, and trunk is intact. There is no evidence of skin rash, skin breakdown or ulceration, or atrophic skin change.   EXTREMITIES:          Vascular:  Right, left hands and feet are warm with good color and pulses. Right and left calf and thigh are nontender and nonswollen.   NEUROLOGICAL:          Orientation:  Patient is alert and oriented to person, place, time and situation. Right and left upper and lower extremity motor and sensory examinations are intact.  MUSCULOSKELETAL: Neck: No tenderness. No pain or limitation with range of motion. Back: No tenderness. Straight leg test negative bilaterally. Right hip and Left hip: There is not tenderness at the greater trochanter. There is not pain with gentle ROM in flexion and extension at the hip. There is not pain with external rotation and abduction. bilateral lower extremity is in good position. Nontender at the calf. Neurovascular is intact. The patient is seen walking today walking with a painful gait.    XR hips bilateral 5+ VW w pelvis when performed    Result Date: 3/4/2025  Interpreted By:  Apolinar Chavez, STUDY: Pelvis and bilateral hip dated 3/4/2025.   INDICATION: Signs/Symptoms:worsening of bilateral hip pain   COMPARISON: Radiographs dated 06/20/2023.   ACCESSION NUMBER(S): [id number]   ORDERING CLINICIAN: TYSHAWN DAY   TECHNIQUE: AP pelvis and two bilateral hip radiographs.   FINDINGS: No fracture or dislocation is evident. Severe degenerative changes seen  of the hips, significantly progressed since the previous exam. Severe degenerative changes seen of the visualized lower lumbar spine. Mild degenerative changes seen of the SI joints and of the pubic symphysis. Vascular calcifications are seen over the soft tissues.       Severe degenerative change of the hips, significantly progressed since the previous exam.   Signed by: Apolinar Chavez 3/4/2025 2:12 PM Dictation workstation:   [id number]       Vanesa was seen today for pain and pain.  Diagnoses and all orders for this visit:  Bilateral hip pain (Primary)  -     Referral to Physical Therapy; Future  -     Referral to Pain Medicine; Future  Primary osteoarthritis of both hips  -     Referral to Orthopedics and Sports Medicine  -     Referral to Physical Therapy; Future  -     Referral to Pain Medicine; Future     Options are discussed with the patient in detail. The patient is given a prescription for aquatic therapy to evaluate and treat with gentle strengthening and ROM exercises with modalities as needed. The patient has excellent ROM considering the severe osteoarthritis at bilateral hips. The patient wishes to avoid operative treatment at this time. She is given a referral to pain management for further evaluation for chronic bilateral hip pain. The patient is instructed regarding activity modification and risk for further injury with falling or trauma and to use the rollator for support, ice, provider directed at home gentle strengthening and ROM exercises, and the appropriate use of Tylenol as needed for pain with its potential adverse reactions and side effects. The patient understands. Follow up as needed. Please note that this report has been produced using speech recognition software.  It may contain errors related to grammar, punctuation or spelling.  Electronically signed, but not reviewed.     Aliya Francis PA-C

## 2025-03-20 NOTE — PATIENT INSTRUCTIONS
Thank you for coming to see us today!     We are going to give you a referral for Pain managment and also aquatic therapy  Please call central scheduling to make this appointment.     Please follow up with us as needed

## 2025-05-06 ENCOUNTER — APPOINTMENT (OUTPATIENT)
Dept: OTOLARYNGOLOGY | Facility: CLINIC | Age: 75
End: 2025-05-06
Payer: MEDICARE